# Patient Record
Sex: MALE | Race: WHITE | NOT HISPANIC OR LATINO | Employment: OTHER | URBAN - METROPOLITAN AREA
[De-identification: names, ages, dates, MRNs, and addresses within clinical notes are randomized per-mention and may not be internally consistent; named-entity substitution may affect disease eponyms.]

---

## 2023-02-28 LAB
ALANINE AMINOTRANSFERASE (SGPT) (U/L) IN SER/PLAS: 24 U/L (ref 10–52)
ALBUMIN (G/DL) IN SER/PLAS: 4.6 G/DL (ref 3.4–5)
ALKALINE PHOSPHATASE (U/L) IN SER/PLAS: 38 U/L (ref 33–136)
ANION GAP IN SER/PLAS: 14 MMOL/L (ref 10–20)
ASPARTATE AMINOTRANSFERASE (SGOT) (U/L) IN SER/PLAS: 19 U/L (ref 9–39)
BASOPHILS (10*3/UL) IN BLOOD BY AUTOMATED COUNT: 0.03 X10E9/L (ref 0–0.1)
BASOPHILS/100 LEUKOCYTES IN BLOOD BY AUTOMATED COUNT: 0.5 % (ref 0–2)
BILIRUBIN TOTAL (MG/DL) IN SER/PLAS: 1 MG/DL (ref 0–1.2)
CALCIUM (MG/DL) IN SER/PLAS: 10.2 MG/DL (ref 8.6–10.6)
CARBON DIOXIDE, TOTAL (MMOL/L) IN SER/PLAS: 29 MMOL/L (ref 21–32)
CHLORIDE (MMOL/L) IN SER/PLAS: 103 MMOL/L (ref 98–107)
CHOLESTEROL (MG/DL) IN SER/PLAS: 155 MG/DL (ref 0–199)
CHOLESTEROL IN HDL (MG/DL) IN SER/PLAS: 43.5 MG/DL
CHOLESTEROL/HDL RATIO: 3.6
CREATININE (MG/DL) IN SER/PLAS: 1.18 MG/DL (ref 0.5–1.3)
EOSINOPHILS (10*3/UL) IN BLOOD BY AUTOMATED COUNT: 0.06 X10E9/L (ref 0–0.7)
EOSINOPHILS/100 LEUKOCYTES IN BLOOD BY AUTOMATED COUNT: 1 % (ref 0–6)
ERYTHROCYTE DISTRIBUTION WIDTH (RATIO) BY AUTOMATED COUNT: 12.4 % (ref 11.5–14.5)
ERYTHROCYTE MEAN CORPUSCULAR HEMOGLOBIN CONCENTRATION (G/DL) BY AUTOMATED: 34.1 G/DL (ref 32–36)
ERYTHROCYTE MEAN CORPUSCULAR VOLUME (FL) BY AUTOMATED COUNT: 91 FL (ref 80–100)
ERYTHROCYTES (10*6/UL) IN BLOOD BY AUTOMATED COUNT: 4.7 X10E12/L (ref 4.5–5.9)
GFR MALE: 68 ML/MIN/1.73M2
GLUCOSE (MG/DL) IN SER/PLAS: 85 MG/DL (ref 74–99)
HEMATOCRIT (%) IN BLOOD BY AUTOMATED COUNT: 42.8 % (ref 41–52)
HEMOGLOBIN (G/DL) IN BLOOD: 14.6 G/DL (ref 13.5–17.5)
IMMATURE GRANULOCYTES/100 LEUKOCYTES IN BLOOD BY AUTOMATED COUNT: 0.3 % (ref 0–0.9)
LDL: 79 MG/DL (ref 0–99)
LEUKOCYTES (10*3/UL) IN BLOOD BY AUTOMATED COUNT: 5.8 X10E9/L (ref 4.4–11.3)
LYMPHOCYTES (10*3/UL) IN BLOOD BY AUTOMATED COUNT: 1.14 X10E9/L (ref 1.2–4.8)
LYMPHOCYTES/100 LEUKOCYTES IN BLOOD BY AUTOMATED COUNT: 19.7 % (ref 13–44)
MONOCYTES (10*3/UL) IN BLOOD BY AUTOMATED COUNT: 0.65 X10E9/L (ref 0.1–1)
MONOCYTES/100 LEUKOCYTES IN BLOOD BY AUTOMATED COUNT: 11.2 % (ref 2–10)
NEUTROPHILS (10*3/UL) IN BLOOD BY AUTOMATED COUNT: 3.88 X10E9/L (ref 1.2–7.7)
NEUTROPHILS/100 LEUKOCYTES IN BLOOD BY AUTOMATED COUNT: 67.3 % (ref 40–80)
NRBC (PER 100 WBCS) BY AUTOMATED COUNT: 0 /100 WBC (ref 0–0)
PLATELETS (10*3/UL) IN BLOOD AUTOMATED COUNT: 204 X10E9/L (ref 150–450)
POTASSIUM (MMOL/L) IN SER/PLAS: 4.6 MMOL/L (ref 3.5–5.3)
PROSTATE SPECIFIC ANTIGEN,SCREEN: 0.62 NG/ML (ref 0–4)
PROTEIN TOTAL: 7.1 G/DL (ref 6.4–8.2)
SODIUM (MMOL/L) IN SER/PLAS: 141 MMOL/L (ref 136–145)
THYROTROPIN (MIU/L) IN SER/PLAS BY DETECTION LIMIT <= 0.05 MIU/L: 1.29 MIU/L (ref 0.44–3.98)
TRIGLYCERIDE (MG/DL) IN SER/PLAS: 163 MG/DL (ref 0–149)
UREA NITROGEN (MG/DL) IN SER/PLAS: 20 MG/DL (ref 6–23)
VLDL: 33 MG/DL (ref 0–40)

## 2023-03-01 LAB
ABO GROUP (TYPE) IN BLOOD: NORMAL
RH FACTOR: NORMAL

## 2023-04-13 ENCOUNTER — TELEPHONE (OUTPATIENT)
Dept: PRIMARY CARE | Facility: CLINIC | Age: 68
End: 2023-04-13
Payer: MEDICARE

## 2023-04-13 NOTE — TELEPHONE ENCOUNTER
Patient called he sees Dr. Hill believes he has pink eye. Left lower eye lid is puffy and sore, lid looks red, itchy on and off, some pain, has had since Tuesday morning , no light sensitive, no discharge.  He was with someone on Saturday that had pink eye. That's why he thinks he has it.       Please advise

## 2023-04-13 NOTE — TELEPHONE ENCOUNTER
This is not likely bacterial conjunctivitis but instead allergy based  Would do warm compresses to eye as many times as able throughout the day  Can get otc allergy eye drops to use as well  Monitor for now  This is not pink eye as you have light sensitivity, thick and copious amounts of drainage that causes severe matting of eyelashes to the point of eye shut when waking up

## 2023-07-21 PROBLEM — J20.9 ACUTE BRONCHITIS: Status: ACTIVE | Noted: 2023-07-21

## 2023-07-21 PROBLEM — J30.9 ALLERGIC RHINITIS: Status: ACTIVE | Noted: 2023-07-21

## 2023-07-21 PROBLEM — R79.82 CRP ELEVATED: Status: ACTIVE | Noted: 2023-07-21

## 2023-07-21 PROBLEM — R06.83 SNORING: Status: ACTIVE | Noted: 2023-07-21

## 2023-07-21 PROBLEM — K63.5 COLON POLYP: Status: ACTIVE | Noted: 2023-07-21

## 2023-07-21 PROBLEM — I45.9 INTRAVENTRICULAR CONDUCTION DELAY: Status: ACTIVE | Noted: 2023-07-21

## 2023-07-21 PROBLEM — M99.03 LUMBOSACRAL DYSFUNCTION: Status: ACTIVE | Noted: 2023-07-21

## 2023-07-21 PROBLEM — U07.1 COVID-19 VIRUS INFECTION: Status: ACTIVE | Noted: 2023-07-21

## 2023-07-21 PROBLEM — K29.50 CHRONIC GASTRITIS: Status: ACTIVE | Noted: 2023-07-21

## 2023-07-21 PROBLEM — M79.18 CERVICAL MYOFASCIAL PAIN SYNDROME: Status: ACTIVE | Noted: 2023-07-21

## 2023-07-21 PROBLEM — M79.10 MYALGIA: Status: ACTIVE | Noted: 2023-07-21

## 2023-07-21 PROBLEM — R73.9 HYPERGLYCEMIA: Status: ACTIVE | Noted: 2023-07-21

## 2023-07-21 PROBLEM — K26.9 DUODENAL ULCER: Status: ACTIVE | Noted: 2023-07-21

## 2023-07-21 PROBLEM — R59.9 ENLARGED LYMPH NODE: Status: ACTIVE | Noted: 2023-07-21

## 2023-07-21 PROBLEM — M99.09 SEGMENTAL AND SOMATIC DYSFUNCTION: Status: ACTIVE | Noted: 2023-07-21

## 2023-07-21 PROBLEM — I10 BENIGN ESSENTIAL HYPERTENSION: Status: ACTIVE | Noted: 2023-07-21

## 2023-07-21 PROBLEM — M54.6 THORACIC SPINE PAIN: Status: ACTIVE | Noted: 2023-07-21

## 2023-07-21 PROBLEM — R31.9 HEMATURIA: Status: ACTIVE | Noted: 2023-07-21

## 2023-07-21 PROBLEM — L65.9 ALOPECIA: Status: ACTIVE | Noted: 2023-07-21

## 2023-07-21 PROBLEM — E78.00 HYPERCHOLESTEROLEMIA: Status: ACTIVE | Noted: 2023-07-21

## 2023-07-21 PROBLEM — J31.0 CHRONIC RHINITIS: Status: ACTIVE | Noted: 2023-07-21

## 2023-07-21 PROBLEM — M19.019 OSTEOARTHRITIS, SHOULDER: Status: ACTIVE | Noted: 2023-07-21

## 2023-07-21 PROBLEM — H93.19 TINNITUS: Status: ACTIVE | Noted: 2023-07-21

## 2023-07-21 PROBLEM — R94.31 ST SEGMENT ABNORMALITY: Status: ACTIVE | Noted: 2023-07-21

## 2023-07-21 PROBLEM — R40.0 SOMNOLENCE, DAYTIME: Status: ACTIVE | Noted: 2023-07-21

## 2023-07-21 PROBLEM — I47.10 PAROXYSMAL SVT (SUPRAVENTRICULAR TACHYCARDIA) (CMS-HCC): Status: ACTIVE | Noted: 2023-07-21

## 2023-07-21 PROBLEM — S46.011A STRAIN OF RIGHT ROTATOR CUFF CAPSULE: Status: ACTIVE | Noted: 2023-07-21

## 2023-07-21 PROBLEM — E55.9 VITAMIN D DEFICIENCY: Status: ACTIVE | Noted: 2023-07-21

## 2023-07-21 PROBLEM — R00.1 BRADYCARDIA BY ELECTROCARDIOGRAM: Status: ACTIVE | Noted: 2023-07-21

## 2023-07-21 PROBLEM — I25.10 CAD (CORONARY ARTERY DISEASE): Status: ACTIVE | Noted: 2023-07-21

## 2023-07-21 PROBLEM — M25.552 HIP PAIN, LEFT: Status: ACTIVE | Noted: 2023-07-21

## 2023-07-21 PROBLEM — M25.811 SHOULDER IMPINGEMENT, RIGHT: Status: ACTIVE | Noted: 2023-07-21

## 2023-07-21 PROBLEM — R53.81 MALAISE AND FATIGUE: Status: ACTIVE | Noted: 2023-07-21

## 2023-07-21 PROBLEM — R06.09 DYSPNEA ON EXERTION: Status: ACTIVE | Noted: 2023-07-21

## 2023-07-21 PROBLEM — R05.9 COUGH: Status: ACTIVE | Noted: 2023-07-21

## 2023-07-21 PROBLEM — R19.5 POSITIVE OCCULT STOOL BLOOD TEST: Status: ACTIVE | Noted: 2023-07-21

## 2023-07-21 PROBLEM — E78.5 HYPERLIPIDEMIA: Status: ACTIVE | Noted: 2023-07-21

## 2023-07-21 PROBLEM — K64.8 INTERNAL HEMORRHOIDS: Status: ACTIVE | Noted: 2023-07-21

## 2023-07-21 PROBLEM — G43.009 MIGRAINE WITHOUT AURA AND WITHOUT STATUS MIGRAINOSUS, NOT INTRACTABLE: Status: ACTIVE | Noted: 2023-07-21

## 2023-07-21 PROBLEM — R91.1 PULMONARY NODULE: Status: ACTIVE | Noted: 2023-07-21

## 2023-07-21 PROBLEM — I27.20 PULMONARY HYPERTENSION, MILD (MULTI): Status: ACTIVE | Noted: 2023-07-21

## 2023-07-21 PROBLEM — R20.0 ANESTHESIA OF SKIN: Status: ACTIVE | Noted: 2023-07-21

## 2023-07-21 PROBLEM — D72.819 WBC DECREASED: Status: ACTIVE | Noted: 2023-07-21

## 2023-07-21 PROBLEM — M79.674 PAIN OF TOE OF RIGHT FOOT: Status: ACTIVE | Noted: 2023-07-21

## 2023-07-21 PROBLEM — K44.9 HIATAL HERNIA: Status: ACTIVE | Noted: 2023-07-21

## 2023-07-21 PROBLEM — R53.83 MALAISE AND FATIGUE: Status: ACTIVE | Noted: 2023-07-21

## 2023-07-21 PROBLEM — M54.2 NECK PAIN: Status: ACTIVE | Noted: 2023-07-21

## 2023-07-21 PROBLEM — K27.9 PUD (PEPTIC ULCER DISEASE): Status: ACTIVE | Noted: 2023-07-21

## 2023-07-21 PROBLEM — S29.019A THORACIC MYOFASCIAL STRAIN: Status: ACTIVE | Noted: 2023-07-21

## 2023-07-21 PROBLEM — K64.4 EXTERNAL HEMORRHOIDS: Status: ACTIVE | Noted: 2023-07-21

## 2023-07-21 PROBLEM — M25.519 SHOULDER PAIN: Status: ACTIVE | Noted: 2023-07-21

## 2023-07-21 PROBLEM — H61.21 IMPACTED CERUMEN OF RIGHT EAR: Status: ACTIVE | Noted: 2023-07-21

## 2023-07-21 PROBLEM — R05.3 COUGH, PERSISTENT: Status: ACTIVE | Noted: 2023-07-21

## 2023-07-21 PROBLEM — G47.33 OBSTRUCTIVE SLEEP APNEA OF ADULT: Status: ACTIVE | Noted: 2023-07-21

## 2023-07-21 RX ORDER — LISINOPRIL 20 MG/1
1 TABLET ORAL DAILY
COMMUNITY
Start: 2022-02-24 | End: 2023-08-15

## 2023-07-21 RX ORDER — MULTIVITAMIN
1 TABLET ORAL DAILY
COMMUNITY

## 2023-07-21 RX ORDER — MINOXIDIL 50 MG/G
AEROSOL, FOAM TOPICAL
COMMUNITY

## 2023-07-21 RX ORDER — TALC
POWDER (GRAM) TOPICAL
COMMUNITY
End: 2023-08-18 | Stop reason: WASHOUT

## 2023-07-21 RX ORDER — ACETAMINOPHEN 500 MG
2 TABLET ORAL DAILY
COMMUNITY

## 2023-07-21 RX ORDER — KETOCONAZOLE 20 MG/ML
SHAMPOO, SUSPENSION TOPICAL
COMMUNITY

## 2023-07-21 RX ORDER — ASPIRIN 81 MG/1
TABLET ORAL
COMMUNITY

## 2023-07-21 RX ORDER — ATORVASTATIN CALCIUM 40 MG/1
1 TABLET, FILM COATED ORAL NIGHTLY
COMMUNITY
Start: 2015-02-24 | End: 2023-09-05

## 2023-07-21 RX ORDER — ACETAMINOPHEN 160 MG/5ML
1 SUSPENSION, ORAL (FINAL DOSE FORM) ORAL DAILY
COMMUNITY

## 2023-07-21 RX ORDER — BENZONATATE 200 MG/1
1 CAPSULE ORAL EVERY 8 HOURS PRN
COMMUNITY
Start: 2022-08-22 | End: 2023-08-18 | Stop reason: WASHOUT

## 2023-08-14 DIAGNOSIS — I10 BENIGN ESSENTIAL HYPERTENSION: Primary | ICD-10-CM

## 2023-08-15 RX ORDER — LISINOPRIL 20 MG/1
20 TABLET ORAL DAILY
Qty: 90 TABLET | Refills: 1 | Status: SHIPPED | OUTPATIENT
Start: 2023-08-15 | End: 2023-11-20

## 2023-08-18 ENCOUNTER — OFFICE VISIT (OUTPATIENT)
Dept: PRIMARY CARE | Facility: CLINIC | Age: 68
End: 2023-08-18
Payer: MEDICARE

## 2023-08-18 VITALS
SYSTOLIC BLOOD PRESSURE: 128 MMHG | BODY MASS INDEX: 30.16 KG/M2 | WEIGHT: 233.3 LBS | DIASTOLIC BLOOD PRESSURE: 74 MMHG | HEART RATE: 60 BPM

## 2023-08-18 DIAGNOSIS — I10 BENIGN ESSENTIAL HYPERTENSION: Primary | ICD-10-CM

## 2023-08-18 DIAGNOSIS — G47.33 OBSTRUCTIVE SLEEP APNEA OF ADULT: ICD-10-CM

## 2023-08-18 DIAGNOSIS — E55.9 VITAMIN D DEFICIENCY: ICD-10-CM

## 2023-08-18 DIAGNOSIS — I25.10 CORONARY ARTERY DISEASE DUE TO CALCIFIED CORONARY LESION: ICD-10-CM

## 2023-08-18 DIAGNOSIS — I25.84 CORONARY ARTERY DISEASE DUE TO CALCIFIED CORONARY LESION: ICD-10-CM

## 2023-08-18 DIAGNOSIS — E78.00 HYPERCHOLESTEROLEMIA: ICD-10-CM

## 2023-08-18 LAB
ALANINE AMINOTRANSFERASE (SGPT) (U/L) IN SER/PLAS: 21 U/L (ref 10–52)
ALBUMIN (G/DL) IN SER/PLAS: 4.7 G/DL (ref 3.4–5)
ALKALINE PHOSPHATASE (U/L) IN SER/PLAS: 45 U/L (ref 33–136)
ANION GAP IN SER/PLAS: 14 MMOL/L (ref 10–20)
ASPARTATE AMINOTRANSFERASE (SGOT) (U/L) IN SER/PLAS: 18 U/L (ref 9–39)
BILIRUBIN TOTAL (MG/DL) IN SER/PLAS: 0.7 MG/DL (ref 0–1.2)
CALCIDIOL (25 OH VITAMIN D3) (NG/ML) IN SER/PLAS: 66 NG/ML
CALCIUM (MG/DL) IN SER/PLAS: 9.9 MG/DL (ref 8.6–10.6)
CARBON DIOXIDE, TOTAL (MMOL/L) IN SER/PLAS: 28 MMOL/L (ref 21–32)
CHLORIDE (MMOL/L) IN SER/PLAS: 103 MMOL/L (ref 98–107)
CHOLESTEROL (MG/DL) IN SER/PLAS: 169 MG/DL (ref 0–199)
CHOLESTEROL IN HDL (MG/DL) IN SER/PLAS: 48.5 MG/DL
CHOLESTEROL/HDL RATIO: 3.5
CREATININE (MG/DL) IN SER/PLAS: 1.15 MG/DL (ref 0.5–1.3)
GFR MALE: 70 ML/MIN/1.73M2
GLUCOSE (MG/DL) IN SER/PLAS: 99 MG/DL (ref 74–99)
LDL: 97 MG/DL (ref 0–99)
POTASSIUM (MMOL/L) IN SER/PLAS: 4.6 MMOL/L (ref 3.5–5.3)
PROTEIN TOTAL: 7.1 G/DL (ref 6.4–8.2)
SODIUM (MMOL/L) IN SER/PLAS: 140 MMOL/L (ref 136–145)
TRIGLYCERIDE (MG/DL) IN SER/PLAS: 120 MG/DL (ref 0–149)
UREA NITROGEN (MG/DL) IN SER/PLAS: 18 MG/DL (ref 6–23)
VLDL: 24 MG/DL (ref 0–40)

## 2023-08-18 PROCEDURE — 82306 VITAMIN D 25 HYDROXY: CPT

## 2023-08-18 PROCEDURE — 3074F SYST BP LT 130 MM HG: CPT | Performed by: INTERNAL MEDICINE

## 2023-08-18 PROCEDURE — 1159F MED LIST DOCD IN RCRD: CPT | Performed by: INTERNAL MEDICINE

## 2023-08-18 PROCEDURE — 1160F RVW MEDS BY RX/DR IN RCRD: CPT | Performed by: INTERNAL MEDICINE

## 2023-08-18 PROCEDURE — 80053 COMPREHEN METABOLIC PANEL: CPT

## 2023-08-18 PROCEDURE — 99214 OFFICE O/P EST MOD 30 MIN: CPT | Performed by: INTERNAL MEDICINE

## 2023-08-18 PROCEDURE — 3078F DIAST BP <80 MM HG: CPT | Performed by: INTERNAL MEDICINE

## 2023-08-18 PROCEDURE — 1036F TOBACCO NON-USER: CPT | Performed by: INTERNAL MEDICINE

## 2023-08-18 PROCEDURE — 80061 LIPID PANEL: CPT

## 2023-08-18 ASSESSMENT — ENCOUNTER SYMPTOMS
ARTHRALGIAS: 0
VOICE CHANGE: 0
DIFFICULTY URINATING: 0
EYE REDNESS: 0
DIZZINESS: 0
FLANK PAIN: 0
NAUSEA: 0
APPETITE CHANGE: 0
HEADACHES: 0
NUMBNESS: 0
SINUS PAIN: 0
FATIGUE: 0
DYSURIA: 0
UNEXPECTED WEIGHT CHANGE: 0
EYE PAIN: 0
BLOOD IN STOOL: 0
TROUBLE SWALLOWING: 0
COUGH: 0
MYALGIAS: 0
ABDOMINAL PAIN: 0
COLOR CHANGE: 0
SLEEP DISTURBANCE: 0
ABDOMINAL DISTENTION: 0
WHEEZING: 0
EYE DISCHARGE: 0
DIARRHEA: 0
FEVER: 0
CONSTIPATION: 0
WOUND: 0
PALPITATIONS: 0
NERVOUS/ANXIOUS: 0
DYSPHORIC MOOD: 0
SINUS PRESSURE: 0
SHORTNESS OF BREATH: 0
NECK PAIN: 0
ACTIVITY CHANGE: 0
WEAKNESS: 0
CHEST TIGHTNESS: 0
SORE THROAT: 0
BRUISES/BLEEDS EASILY: 0
SEIZURES: 0
TREMORS: 0
DIAPHORESIS: 0
BACK PAIN: 0
ADENOPATHY: 0
HEMATURIA: 0
FACIAL SWELLING: 0
VOMITING: 0
FREQUENCY: 0
EYE ITCHING: 0
JOINT SWELLING: 0
CHOKING: 0
CHILLS: 0
CONFUSION: 0
NECK STIFFNESS: 0
LIGHT-HEADEDNESS: 0

## 2023-08-18 NOTE — PROGRESS NOTES
Subjective   Patient ID: Mike Valdez is a 68 y.o. male who presents for Follow-up (6m).    He has a history of CAD, hypercholesterolemia, SVTs, BRENTON vitamin d deficiency    BP at home 109/62 - 130/69, mostly 110-120s/ 60s  He is cycling quite a bit. More active in summer. Does have sore tailbone since then.             Review of Systems   Constitutional:  Negative for activity change, appetite change, chills, diaphoresis, fatigue, fever and unexpected weight change.   HENT:  Positive for postnasal drip. Negative for congestion, ear discharge, ear pain, facial swelling, hearing loss, sinus pressure, sinus pain, sore throat, trouble swallowing and voice change.    Eyes:  Negative for pain, discharge, redness, itching and visual disturbance.   Respiratory:  Negative for cough, choking, chest tightness, shortness of breath and wheezing.    Cardiovascular:  Negative for chest pain, palpitations and leg swelling.   Gastrointestinal:  Negative for abdominal distention, abdominal pain, blood in stool, constipation, diarrhea, nausea and vomiting.   Endocrine: Negative for cold intolerance and heat intolerance.   Genitourinary:  Negative for difficulty urinating, dysuria, flank pain, frequency, hematuria and urgency.   Musculoskeletal:  Negative for arthralgias, back pain, gait problem, joint swelling, myalgias, neck pain and neck stiffness.   Skin:  Negative for color change, pallor, rash and wound.   Neurological:  Negative for dizziness, tremors, seizures, syncope, weakness, light-headedness, numbness and headaches.   Hematological:  Negative for adenopathy. Does not bruise/bleed easily.   Psychiatric/Behavioral:  Negative for behavioral problems, confusion, dysphoric mood, sleep disturbance and suicidal ideas. The patient is not nervous/anxious.        Objective   /74   Pulse 60   Wt 106 kg (233 lb 4.8 oz)   BMI 30.16 kg/m²     Physical Exam  Constitutional:       Appearance: Normal appearance. He is obese.    HENT:      Head: Normocephalic and atraumatic.   Eyes:      General: No scleral icterus.     Pupils: Pupils are equal, round, and reactive to light.   Neck:      Vascular: No carotid bruit.   Cardiovascular:      Rate and Rhythm: Normal rate and regular rhythm.      Pulses: Normal pulses.      Heart sounds: Normal heart sounds.      Comments: Mild venous stasis discoloration bilateral lower legs  Pulmonary:      Effort: Pulmonary effort is normal.      Breath sounds: Normal breath sounds. No wheezing, rhonchi or rales.   Abdominal:      General: Bowel sounds are normal. There is no distension.      Palpations: Abdomen is soft. There is no mass.      Tenderness: There is no abdominal tenderness.   Musculoskeletal:      Cervical back: Normal range of motion.      Right lower leg: Edema (trace) present.      Left lower leg: Edema (trace) present.   Skin:     General: Skin is warm and dry.   Neurological:      General: No focal deficit present.      Mental Status: He is alert and oriented to person, place, and time. Mental status is at baseline.   Psychiatric:         Mood and Affect: Mood normal.         Behavior: Behavior normal.         Thought Content: Thought content normal.         Judgment: Judgment normal.         Assessment/Plan   Problem List Items Addressed This Visit       Benign essential hypertension - Primary     Blood pressure excellent  Continue lisinopril 20 mg daily  Regular exercise and DASH diet         Relevant Orders    Comprehensive Metabolic Panel    CAD (coronary artery disease)     Clinically stable  continue aspirin 81 milligrams, atorvastatin 40 milligrams         Relevant Orders    Comprehensive Metabolic Panel    Lipid Panel    Hypercholesterolemia     Continue atorvastatin 40 mg daily  Blood work done         Relevant Orders    Comprehensive Metabolic Panel    Lipid Panel    Obstructive sleep apnea of adult      doing well with AutoPap per sleep specialist Dr. Mcgovern         Vitamin D  deficiency     Currently taking vitamin D 2000 international units daily  We will check vitamin D 25-hydroxy and further advise         Relevant Orders    Vitamin D, Total

## 2023-08-18 NOTE — ASSESSMENT & PLAN NOTE
Currently taking vitamin D 2000 international units daily  We will check vitamin D 25-hydroxy and further advise   Hpi Title: Evaluation of Skin Lesions How Severe Are Your Spot(S)?: mild Have Your Spot(S) Been Treated In The Past?: has not been treated

## 2023-08-31 DIAGNOSIS — E78.5 HYPERLIPIDEMIA, UNSPECIFIED HYPERLIPIDEMIA TYPE: ICD-10-CM

## 2023-09-05 RX ORDER — ATORVASTATIN CALCIUM 40 MG/1
40 TABLET, FILM COATED ORAL NIGHTLY
Qty: 90 TABLET | Refills: 1 | Status: SHIPPED | OUTPATIENT
Start: 2023-09-05 | End: 2023-11-10

## 2023-11-09 DIAGNOSIS — E78.5 HYPERLIPIDEMIA, UNSPECIFIED HYPERLIPIDEMIA TYPE: ICD-10-CM

## 2023-11-10 RX ORDER — ATORVASTATIN CALCIUM 40 MG/1
40 TABLET, FILM COATED ORAL NIGHTLY
Qty: 100 TABLET | Refills: 0 | Status: SHIPPED | OUTPATIENT
Start: 2023-11-10 | End: 2024-01-19

## 2023-11-17 DIAGNOSIS — I10 BENIGN ESSENTIAL HYPERTENSION: ICD-10-CM

## 2023-11-20 RX ORDER — LISINOPRIL 20 MG/1
20 TABLET ORAL DAILY
Qty: 90 TABLET | Refills: 0 | Status: SHIPPED | OUTPATIENT
Start: 2023-11-20 | End: 2024-03-14 | Stop reason: SDUPTHER

## 2024-01-18 DIAGNOSIS — E78.5 HYPERLIPIDEMIA, UNSPECIFIED HYPERLIPIDEMIA TYPE: ICD-10-CM

## 2024-01-19 RX ORDER — ATORVASTATIN CALCIUM 40 MG/1
40 TABLET, FILM COATED ORAL NIGHTLY
Qty: 100 TABLET | Refills: 2 | Status: SHIPPED | OUTPATIENT
Start: 2024-01-19 | End: 2024-03-14 | Stop reason: SDUPTHER

## 2024-01-19 NOTE — TELEPHONE ENCOUNTER
See refill request, he has an office visit with you 03/14/2024, he was a patient of Dr. Francis.    Thank you.

## 2024-02-05 ENCOUNTER — PATIENT MESSAGE (OUTPATIENT)
Dept: PRIMARY CARE | Facility: CLINIC | Age: 69
End: 2024-02-05
Payer: MEDICARE

## 2024-02-12 ENCOUNTER — OFFICE VISIT (OUTPATIENT)
Dept: SLEEP MEDICINE | Facility: CLINIC | Age: 69
End: 2024-02-12
Payer: MEDICARE

## 2024-02-12 VITALS
DIASTOLIC BLOOD PRESSURE: 86 MMHG | WEIGHT: 250.6 LBS | TEMPERATURE: 98.4 F | BODY MASS INDEX: 32.39 KG/M2 | HEART RATE: 72 BPM | SYSTOLIC BLOOD PRESSURE: 172 MMHG

## 2024-02-12 DIAGNOSIS — G47.33 OSA (OBSTRUCTIVE SLEEP APNEA): ICD-10-CM

## 2024-02-12 DIAGNOSIS — I10 BENIGN ESSENTIAL HYPERTENSION: ICD-10-CM

## 2024-02-12 DIAGNOSIS — G47.33 OBSTRUCTIVE SLEEP APNEA OF ADULT: Primary | ICD-10-CM

## 2024-02-12 PROCEDURE — 1160F RVW MEDS BY RX/DR IN RCRD: CPT | Performed by: STUDENT IN AN ORGANIZED HEALTH CARE EDUCATION/TRAINING PROGRAM

## 2024-02-12 PROCEDURE — 3079F DIAST BP 80-89 MM HG: CPT | Performed by: STUDENT IN AN ORGANIZED HEALTH CARE EDUCATION/TRAINING PROGRAM

## 2024-02-12 PROCEDURE — 1036F TOBACCO NON-USER: CPT | Performed by: STUDENT IN AN ORGANIZED HEALTH CARE EDUCATION/TRAINING PROGRAM

## 2024-02-12 PROCEDURE — 1159F MED LIST DOCD IN RCRD: CPT | Performed by: STUDENT IN AN ORGANIZED HEALTH CARE EDUCATION/TRAINING PROGRAM

## 2024-02-12 PROCEDURE — 1157F ADVNC CARE PLAN IN RCRD: CPT | Performed by: STUDENT IN AN ORGANIZED HEALTH CARE EDUCATION/TRAINING PROGRAM

## 2024-02-12 PROCEDURE — 3077F SYST BP >= 140 MM HG: CPT | Performed by: STUDENT IN AN ORGANIZED HEALTH CARE EDUCATION/TRAINING PROGRAM

## 2024-02-12 PROCEDURE — 99204 OFFICE O/P NEW MOD 45 MIN: CPT | Performed by: STUDENT IN AN ORGANIZED HEALTH CARE EDUCATION/TRAINING PROGRAM

## 2024-02-12 ASSESSMENT — SLEEP AND FATIGUE QUESTIONNAIRES
SLEEP_PROBLEM_INTERFERES_DAILY_ACTIVITIES: A LITTLE
HOW LIKELY ARE YOU TO NOD OFF OR FALL ASLEEP WHEN YOU ARE A PASSENGER IN A CAR FOR AN HOUR WITHOUT A BREAK: WOULD NEVER DOZE
HOW LIKELY ARE YOU TO NOD OFF OR FALL ASLEEP WHILE SITTING AND TALKING TO SOMEONE: WOULD NEVER DOZE
HOW LIKELY ARE YOU TO NOD OFF OR FALL ASLEEP WHILE SITTING AND READING: SLIGHT CHANCE OF DOZING
HOW LIKELY ARE YOU TO NOD OFF OR FALL ASLEEP WHILE WATCHING TV: SLIGHT CHANCE OF DOZING
DIFFICULTY_STAYING_ASLEEP: MILD
HOW LIKELY ARE YOU TO NOD OFF OR FALL ASLEEP WHILE LYING DOWN TO REST IN THE AFTERNOON WHEN CIRCUMSTANCES PERMIT: WOULD NEVER DOZE
SLEEP_PROBLEM_NOTICEABLE_TO_OTHERS: A LITTLE
WORRIED_DISTRESSED_DUE_TO_SLEEP: A LITTLE
ESS-CHAD TOTAL SCORE: 4
HOW LIKELY ARE YOU TO NOD OFF OR FALL ASLEEP IN A CAR, WHILE STOPPED FOR A FEW MINUTES IN TRAFFIC: WOULD NEVER DOZE
SATISFACTION_WITH_CURRENT_SLEEP_PATTERN: VERY SATISFIED
SITING INACTIVE IN A PUBLIC PLACE LIKE A CLASS ROOM OR A MOVIE THEATER: SLIGHT CHANCE OF DOZING
HOW LIKELY ARE YOU TO NOD OFF OR FALL ASLEEP WHILE SITTING QUIETLY AFTER LUNCH WITHOUT ALCOHOL: SLIGHT CHANCE OF DOZING

## 2024-02-12 ASSESSMENT — ENCOUNTER SYMPTOMS
CONSTITUTIONAL NEGATIVE: 1
RESPIRATORY NEGATIVE: 1
PSYCHIATRIC NEGATIVE: 1
CARDIOVASCULAR NEGATIVE: 1
NEUROLOGICAL NEGATIVE: 1

## 2024-02-12 NOTE — LETTER
"2024     Asia Dasilva MD  [address]  [address]  [facility] [address]    Patient: Mike Valdez   YOB: 1955   Date of Visit: 2024       Dear Dr. Asia Dasilva MD:    Thank you for referring Mike Valdez to me for evaluation. Below are my notes for this consultation.  If you have questions, please do not hesitate to call me. I look forward to following your patient along with you.       Sincerely,     Senthil Bishop MD      CC: No Recipients  ______________________________________________________________________________________     Patient: Anthony Valdez    [id number]  : 1955 -- AGE 69 y.o.    Provider: Senthil Bishop MD     Location [facility]   Service Date: 2024              [facility]  New Visit Note      HISTORY OF PRESENT ILLNESS     The patient's referring provider is: Asia Dasilva MD    HISTORY OF PRESENT ILLNESS   Anthony Valdez \"Mike\" is a 69 y.o. male with h/o Hypertension, BRENTON, and Obesity who presents to a [facility] for a sleep medicine evaluation with concerns of Follow-up.     Past Sleep History  Patient has the following sleep-related diagnoses: obstructive sleep apnea. Prior sleep study results: significant for BRENTON .  He remembered his AHI back then was in the 40's.    Current History    On today's visit, the patient reports no issues with his CPAP therapy.  His previous provider unfortunately has passed away and he needs a new sleep provider.  His BP is elevated today but he reports that he is typically like that with some \"White Coat HTN\".  BP at home is pretty good.  Working on weight loss (less active during the winter but ready to get back).    Sleep schedule:      Weekdays / Work Days Weekends / Days Off   Bedtime 11 pm  same as weekdays   Sleep latency 15 min same as weekdays   Wake time 7:30 am  same as weekdays   Total sleep time  Average/day:  Total sleep time " 8 hours/day Same as weekdays   Naps No   Nocturnal awakenings Yes, about 0-1 times a night     Preferred sleeping position: SLEEP POSITION: sidelying    Sleep-related ROS:    Sleep Initiation: no problems going to sleep  Problems going to sleep associated with: No problems going to sleep    Sleep Maintenance: wakes up about 0-1 times per night and easily returns to sleep after awakening  Problems staying asleep associated with: Problems Staying Asleep: nocturia    Breathing during sleep: no symptoms of snoring or concerns of breathing at night with PAP therapy    RLS screen:  RLSSCREEN: - Sensations: Patient does not have unusual sensations in their extremities that cause an urge to move them     Daytime Symptoms  On awakening patient reports: no morning symptoms    Patient report some daytime symptoms including: DAYTIME SYMPTOMS: reports no daytime symptoms  Patient denies daytime symptoms including: Denies: excessive daytime sleepiness  Fatigue: denies feeling fatigue    ESS: 4  CELSO: 4  FOSQ: 40      REVIEW OF SYSTEMS     REVIEW OF SYSTEMS  Review of Systems   Constitutional: Negative.    HENT: Negative.     Respiratory: Negative.     Cardiovascular: Negative.    Genitourinary: Negative.    Skin: Negative.    Neurological: Negative.    Psychiatric/Behavioral: Negative.       ALLERGIES AND MEDICATIONS     ALLERGIES  No Known Allergies    MEDICATIONS  Current Outpatient Medications   Medication Sig Dispense Refill   • aspirin 81 mg EC tablet Take by mouth.     • atorvastatin (Lipitor) 40 mg tablet TAKE 1 TABLET BY MOUTH ONCE  DAILY AT BEDTIME 100 tablet 2   • cholecalciferol (Vitamin D-3) 50 mcg (2,000 unit) capsule Take 2 capsules (100 mcg) by mouth early in the morning..     • coenzyme Q-10 200 mg capsule Take 1 capsule (200 mg) by mouth once daily.     • ketoconazole (Nizoral) 2 % shampoo Use twice weekly     • lisinopril 20 mg tablet TAKE 1 TABLET BY MOUTH DAILY 90 tablet 0   • minoxidiL (Rogaine) 5 % foam USE  AS DIRECTED ON PACKAGE     • multivitamin tablet Take 1 tablet by mouth once daily.       No current facility-administered medications for this visit.         PAST HISTORY     PAST MEDICAL HISTORY  He  has a past medical history of Chronic rhinitis (12/04/2013), Personal history of other diseases of the digestive system, Personal history of other diseases of the digestive system, Personal history of other diseases of the respiratory system, and Unspecified fracture of lower end of unspecified humerus, initial encounter for closed fracture.    PAST SURGICAL HISTORY:  Past Surgical History:   Procedure Laterality Date   • OTHER SURGICAL HISTORY  02/24/2022    Rotator cuff repair   • OTHER SURGICAL HISTORY  03/02/2015    Acellular Dermal Allograft Eyelids   • VASECTOMY  04/10/2014    Surgery Vas Deferens Vasectomy       FAMILY HISTORY  No family history on file.    He does not have a family history of sleep disorder.    SOCIAL HISTORY  He  reports that he has never smoked. He has never used smokeless tobacco. He reports current alcohol use of about 7.0 standard drinks of alcohol per week. He reports that he does not use drugs.     Caffeine consumption: Yes, rarely (occasional)  Alcohol consumption: Yes, 1 drinks/day  Smoking: No  Marijuana: No      PHYSICAL EXAM     VITAL SIGNS: /86 (BP Location: Right arm, Patient Position: Sitting, BP Cuff Size: Adult)   Pulse 72   Temp 36.9 °C (98.4 °F) (Temporal)   Wt 114 kg (250 lb 9.6 oz)   BMI 32.39 kg/m²      CURRENT WEIGHT:   Vitals:    02/12/24 1020   Weight: 114 kg (250 lb 9.6 oz)     Body mass index is 32.39 kg/m².  PREVIOUS WEIGHTS:  Wt Readings from Last 3 Encounters:   02/12/24 114 kg (250 lb 9.6 oz)   08/18/23 106 kg (233 lb 4.8 oz)   02/28/23 106 kg (234 lb 8 oz)       Physical Exam  Vitals reviewed.   Constitutional:       General: He is not in acute distress.     Appearance: Normal appearance. He is well-developed and normal weight.   HENT:      Head:  Normocephalic and atraumatic.      Nose: Nose normal. No congestion or rhinorrhea.      Mouth/Throat:      Mouth: Mucous membranes are moist.      Pharynx: Oropharynx is clear. No oropharyngeal exudate.   Eyes:      General: No scleral icterus.     Extraocular Movements: Extraocular movements intact.      Conjunctiva/sclera: Conjunctivae normal.      Pupils: Pupils are equal, round, and reactive to light.   Neck:      Thyroid: No thyroid mass or thyroid tenderness.      Vascular: No JVD.   Cardiovascular:      Rate and Rhythm: Normal rate.      Pulses: Normal pulses.   Pulmonary:      Effort: Pulmonary effort is normal. No respiratory distress.   Musculoskeletal:      Cervical back: Normal range of motion and neck supple. No rigidity or tenderness.   Lymphadenopathy:      Cervical: No cervical adenopathy.   Neurological:      Mental Status: He is alert and oriented to person, place, and time.   Psychiatric:         Mood and Affect: Mood normal.         Behavior: Behavior normal.         Thought Content: Thought content normal.       PHYSICAL EXAM: MODIFIED MALLAMPATI SCORE: III (soft and hard palate and base of uvula visible)  TONGUE SCALLOPING: without scalloping      RESULTS/DATA     Bicarbonate (mmol/L)   Date Value   08/18/2023 28   02/28/2023 29   09/19/2022 24     Iron (ug/dL)   Date Value   12/28/2018 104     Iron Saturation (%)   Date Value   12/28/2018 35     TIBC (ug/dL)   Date Value   12/28/2018 300     Ferritin (ug/L)   Date Value   12/28/2018 266             ASSESSMENT/PLAN     Mr. Valdez is a 69 y.o. male and He was referred to the [facility] for evaluation of BRENTON    Problem List, Orders, Assessment, Recommendations:  Problem List Items Addressed This Visit             ICD-10-CM    Benign essential hypertension I10     BP Readings from Last 1 Encounters:   02/12/24 172/86   - doing well, elevated today (typically so in doc's office) but asymptomatic  - discussed at  length the impact of untreated BRENTON and BP control  - continue current management and follow-up with PCP          Obstructive sleep apnea of adult - Primary G47.33     He is looking for new sleep provider.  BRENTON diagnosed 6 years ago (AHI ~ 40)  Now on 3B Nubia with setting of 4-20 cwp  rAHI was 2, 95% pressure was 8.5 cwp  No issues  Renew supply via [employer]          Other Visit Diagnoses         Codes    BRENTON (obstructive sleep apnea)     G47.33    Relevant Orders    Positive Airway Pressure (PAP) Therapy            Disposition    Return to clinic in 12 months

## 2024-02-12 NOTE — PROGRESS NOTES
" Patient: Anthony Valdez    [id number]  : 1955 -- AGE 69 y.o.    Provider: Senthil Bishop MD     Location [facility]   Service Date: 2024              [facility]  New Visit Note      HISTORY OF PRESENT ILLNESS     The patient's referring provider is: Asia Dasilva MD    HISTORY OF PRESENT ILLNESS   Anthony Valdez \"Mike\" is a 69 y.o. male with h/o Hypertension, BRENTON, and Obesity who presents to a [facility] for a sleep medicine evaluation with concerns of Follow-up.     Past Sleep History  Patient has the following sleep-related diagnoses: obstructive sleep apnea. Prior sleep study results: significant for BRENTON .  He remembered his AHI back then was in the 40's.    Current History    On today's visit, the patient reports no issues with his CPAP therapy.  His previous provider unfortunately has passed away and he needs a new sleep provider.  His BP is elevated today but he reports that he is typically like that with some \"White Coat HTN\".  BP at home is pretty good.  Working on weight loss (less active during the winter but ready to get back).    Sleep schedule:      Weekdays / Work Days Weekends / Days Off   Bedtime 11 pm  same as weekdays   Sleep latency 15 min same as weekdays   Wake time 7:30 am  same as weekdays   Total sleep time  Average/day:  Total sleep time 8 hours/day Same as weekdays   Naps No   Nocturnal awakenings Yes, about 0-1 times a night     Preferred sleeping position: SLEEP POSITION: sidelying    Sleep-related ROS:    Sleep Initiation: no problems going to sleep  Problems going to sleep associated with: No problems going to sleep    Sleep Maintenance: wakes up about 0-1 times per night and easily returns to sleep after awakening  Problems staying asleep associated with: Problems Staying Asleep: nocturia    Breathing during sleep: no symptoms of snoring or concerns of breathing at night with PAP therapy    RLS " screen:  RLSSCREEN: - Sensations: Patient does not have unusual sensations in their extremities that cause an urge to move them     Daytime Symptoms  On awakening patient reports: no morning symptoms    Patient report some daytime symptoms including: DAYTIME SYMPTOMS: reports no daytime symptoms  Patient denies daytime symptoms including: Denies: excessive daytime sleepiness  Fatigue: denies feeling fatigue    ESS: 4  CELSO: 4  FOSQ: 40      REVIEW OF SYSTEMS     REVIEW OF SYSTEMS  Review of Systems   Constitutional: Negative.    HENT: Negative.     Respiratory: Negative.     Cardiovascular: Negative.    Genitourinary: Negative.    Skin: Negative.    Neurological: Negative.    Psychiatric/Behavioral: Negative.       ALLERGIES AND MEDICATIONS     ALLERGIES  No Known Allergies    MEDICATIONS  Current Outpatient Medications   Medication Sig Dispense Refill    aspirin 81 mg EC tablet Take by mouth.      atorvastatin (Lipitor) 40 mg tablet TAKE 1 TABLET BY MOUTH ONCE  DAILY AT BEDTIME 100 tablet 2    cholecalciferol (Vitamin D-3) 50 mcg (2,000 unit) capsule Take 2 capsules (100 mcg) by mouth early in the morning..      coenzyme Q-10 200 mg capsule Take 1 capsule (200 mg) by mouth once daily.      ketoconazole (Nizoral) 2 % shampoo Use twice weekly      lisinopril 20 mg tablet TAKE 1 TABLET BY MOUTH DAILY 90 tablet 0    minoxidiL (Rogaine) 5 % foam USE AS DIRECTED ON PACKAGE      multivitamin tablet Take 1 tablet by mouth once daily.       No current facility-administered medications for this visit.         PAST HISTORY     PAST MEDICAL HISTORY  He  has a past medical history of Chronic rhinitis (12/04/2013), Personal history of other diseases of the digestive system, Personal history of other diseases of the digestive system, Personal history of other diseases of the respiratory system, and Unspecified fracture of lower end of unspecified humerus, initial encounter for closed fracture.    PAST SURGICAL HISTORY:  Past  Surgical History:   Procedure Laterality Date    OTHER SURGICAL HISTORY  02/24/2022    Rotator cuff repair    OTHER SURGICAL HISTORY  03/02/2015    Acellular Dermal Allograft Eyelids    VASECTOMY  04/10/2014    Surgery Vas Deferens Vasectomy       FAMILY HISTORY  No family history on file.    He does not have a family history of sleep disorder.    SOCIAL HISTORY  He  reports that he has never smoked. He has never used smokeless tobacco. He reports current alcohol use of about 7.0 standard drinks of alcohol per week. He reports that he does not use drugs.     Caffeine consumption: Yes, rarely (occasional)  Alcohol consumption: Yes, 1 drinks/day  Smoking: No  Marijuana: No      PHYSICAL EXAM     VITAL SIGNS: /86 (BP Location: Right arm, Patient Position: Sitting, BP Cuff Size: Adult)   Pulse 72   Temp 36.9 °C (98.4 °F) (Temporal)   Wt 114 kg (250 lb 9.6 oz)   BMI 32.39 kg/m²      CURRENT WEIGHT:   Vitals:    02/12/24 1020   Weight: 114 kg (250 lb 9.6 oz)     Body mass index is 32.39 kg/m².  PREVIOUS WEIGHTS:  Wt Readings from Last 3 Encounters:   02/12/24 114 kg (250 lb 9.6 oz)   08/18/23 106 kg (233 lb 4.8 oz)   02/28/23 106 kg (234 lb 8 oz)       Physical Exam  Vitals reviewed.   Constitutional:       General: He is not in acute distress.     Appearance: Normal appearance. He is well-developed and normal weight.   HENT:      Head: Normocephalic and atraumatic.      Nose: Nose normal. No congestion or rhinorrhea.      Mouth/Throat:      Mouth: Mucous membranes are moist.      Pharynx: Oropharynx is clear. No oropharyngeal exudate.   Eyes:      General: No scleral icterus.     Extraocular Movements: Extraocular movements intact.      Conjunctiva/sclera: Conjunctivae normal.      Pupils: Pupils are equal, round, and reactive to light.   Neck:      Thyroid: No thyroid mass or thyroid tenderness.      Vascular: No JVD.   Cardiovascular:      Rate and Rhythm: Normal rate.      Pulses: Normal pulses.   Pulmonary:       Effort: Pulmonary effort is normal. No respiratory distress.   Musculoskeletal:      Cervical back: Normal range of motion and neck supple. No rigidity or tenderness.   Lymphadenopathy:      Cervical: No cervical adenopathy.   Neurological:      Mental Status: He is alert and oriented to person, place, and time.   Psychiatric:         Mood and Affect: Mood normal.         Behavior: Behavior normal.         Thought Content: Thought content normal.       PHYSICAL EXAM: MODIFIED MALLAMPATI SCORE: III (soft and hard palate and base of uvula visible)  TONGUE SCALLOPING: without scalloping      RESULTS/DATA     Bicarbonate (mmol/L)   Date Value   08/18/2023 28   02/28/2023 29   09/19/2022 24     Iron (ug/dL)   Date Value   12/28/2018 104     Iron Saturation (%)   Date Value   12/28/2018 35     TIBC (ug/dL)   Date Value   12/28/2018 300     Ferritin (ug/L)   Date Value   12/28/2018 266             ASSESSMENT/PLAN     Mr. Valdez is a 69 y.o. male and He was referred to the [facility] for evaluation of BRENTON    Problem List, Orders, Assessment, Recommendations:  Problem List Items Addressed This Visit             ICD-10-CM    Benign essential hypertension I10     BP Readings from Last 1 Encounters:   02/12/24 172/86   - doing well, elevated today (typically so in doc's office) but asymptomatic  - discussed at length the impact of untreated BRENTON and BP control  - continue current management and follow-up with PCP          Obstructive sleep apnea of adult - Primary G47.33     He is looking for new sleep provider.  BRENTON diagnosed 6 years ago (AHI ~ 40)  Now on 3B Nubia with setting of 4-20 cwp  rAHI was 2, 95% pressure was 8.5 cwp  No issues  Renew supply via [employer]          Other Visit Diagnoses         Codes    BRENTON (obstructive sleep apnea)     G47.33    Relevant Orders    Positive Airway Pressure (PAP) Therapy            Disposition    Return to clinic in 12 months

## 2024-02-12 NOTE — ASSESSMENT & PLAN NOTE
He is looking for new sleep provider.  BRENTON diagnosed 6 years ago (AHI ~ 40)  Now on 3B Nubia with setting of 4-20 cwp  rAHI was 2, 95% pressure was 8.5 cwp  No issues  Renew supply via [facility]

## 2024-02-12 NOTE — ASSESSMENT & PLAN NOTE
BMI Readings from Last 1 Encounters:   02/12/24 32.39 kg/m²     - encouraged healthy weight loss via diet and exercise

## 2024-02-12 NOTE — ASSESSMENT & PLAN NOTE
BP Readings from Last 1 Encounters:   02/12/24 172/86     - doing well, elevated today (typically so in doc's office) but asymptomatic  - discussed at length the impact of untreated BRENTON and BP control  - continue current management and follow-up with PCP

## 2024-03-12 ENCOUNTER — APPOINTMENT (OUTPATIENT)
Dept: PRIMARY CARE | Facility: CLINIC | Age: 69
End: 2024-03-12
Payer: MEDICARE

## 2024-03-14 ENCOUNTER — LAB (OUTPATIENT)
Dept: LAB | Facility: LAB | Age: 69
End: 2024-03-14
Payer: MEDICARE

## 2024-03-14 ENCOUNTER — OFFICE VISIT (OUTPATIENT)
Dept: PRIMARY CARE | Facility: CLINIC | Age: 69
End: 2024-03-14
Payer: MEDICARE

## 2024-03-14 VITALS
HEIGHT: 74 IN | WEIGHT: 243.5 LBS | DIASTOLIC BLOOD PRESSURE: 75 MMHG | BODY MASS INDEX: 31.25 KG/M2 | HEART RATE: 66 BPM | SYSTOLIC BLOOD PRESSURE: 128 MMHG

## 2024-03-14 DIAGNOSIS — I10 BENIGN ESSENTIAL HYPERTENSION: ICD-10-CM

## 2024-03-14 DIAGNOSIS — G89.29 CHRONIC MIDLINE LOW BACK PAIN WITHOUT SCIATICA: ICD-10-CM

## 2024-03-14 DIAGNOSIS — M54.50 CHRONIC MIDLINE LOW BACK PAIN WITHOUT SCIATICA: ICD-10-CM

## 2024-03-14 DIAGNOSIS — E78.5 HYPERLIPIDEMIA, UNSPECIFIED HYPERLIPIDEMIA TYPE: ICD-10-CM

## 2024-03-14 DIAGNOSIS — Z00.00 MEDICARE ANNUAL WELLNESS VISIT, SUBSEQUENT: ICD-10-CM

## 2024-03-14 DIAGNOSIS — N52.9 ERECTILE DYSFUNCTION, UNSPECIFIED ERECTILE DYSFUNCTION TYPE: ICD-10-CM

## 2024-03-14 DIAGNOSIS — Z00.00 MEDICARE ANNUAL WELLNESS VISIT, SUBSEQUENT: Primary | ICD-10-CM

## 2024-03-14 LAB
ALBUMIN SERPL BCP-MCNC: 4.6 G/DL (ref 3.4–5)
ALP SERPL-CCNC: 42 U/L (ref 33–136)
ALT SERPL W P-5'-P-CCNC: 26 U/L (ref 10–52)
ANION GAP SERPL CALC-SCNC: 12 MMOL/L (ref 10–20)
AST SERPL W P-5'-P-CCNC: 21 U/L (ref 9–39)
BILIRUB SERPL-MCNC: 1.3 MG/DL (ref 0–1.2)
BUN SERPL-MCNC: 21 MG/DL (ref 6–23)
CALCIUM SERPL-MCNC: 9.7 MG/DL (ref 8.6–10.6)
CHLORIDE SERPL-SCNC: 105 MMOL/L (ref 98–107)
CHOLEST SERPL-MCNC: 162 MG/DL (ref 0–199)
CHOLESTEROL/HDL RATIO: 3.9
CO2 SERPL-SCNC: 28 MMOL/L (ref 21–32)
CREAT SERPL-MCNC: 1.09 MG/DL (ref 0.5–1.3)
EGFRCR SERPLBLD CKD-EPI 2021: 74 ML/MIN/1.73M*2
EST. AVERAGE GLUCOSE BLD GHB EST-MCNC: 94 MG/DL
GLUCOSE SERPL-MCNC: 102 MG/DL (ref 74–99)
HBA1C MFR BLD: 4.9 %
HDLC SERPL-MCNC: 41.5 MG/DL
LDLC SERPL CALC-MCNC: 82 MG/DL
NON HDL CHOLESTEROL: 121 MG/DL (ref 0–149)
POTASSIUM SERPL-SCNC: 4 MMOL/L (ref 3.5–5.3)
PROT SERPL-MCNC: 7 G/DL (ref 6.4–8.2)
PSA SERPL-MCNC: 0.68 NG/ML
SODIUM SERPL-SCNC: 141 MMOL/L (ref 136–145)
TRIGL SERPL-MCNC: 193 MG/DL (ref 0–149)
VLDL: 39 MG/DL (ref 0–40)

## 2024-03-14 PROCEDURE — 1160F RVW MEDS BY RX/DR IN RCRD: CPT | Performed by: INTERNAL MEDICINE

## 2024-03-14 PROCEDURE — 80053 COMPREHEN METABOLIC PANEL: CPT

## 2024-03-14 PROCEDURE — 3074F SYST BP LT 130 MM HG: CPT | Performed by: INTERNAL MEDICINE

## 2024-03-14 PROCEDURE — 1123F ACP DISCUSS/DSCN MKR DOCD: CPT | Performed by: INTERNAL MEDICINE

## 2024-03-14 PROCEDURE — 84153 ASSAY OF PSA TOTAL: CPT

## 2024-03-14 PROCEDURE — 1157F ADVNC CARE PLAN IN RCRD: CPT | Performed by: INTERNAL MEDICINE

## 2024-03-14 PROCEDURE — 3078F DIAST BP <80 MM HG: CPT | Performed by: INTERNAL MEDICINE

## 2024-03-14 PROCEDURE — 1158F ADVNC CARE PLAN TLK DOCD: CPT | Performed by: INTERNAL MEDICINE

## 2024-03-14 PROCEDURE — 99213 OFFICE O/P EST LOW 20 MIN: CPT | Performed by: INTERNAL MEDICINE

## 2024-03-14 PROCEDURE — 36415 COLL VENOUS BLD VENIPUNCTURE: CPT

## 2024-03-14 PROCEDURE — 80061 LIPID PANEL: CPT

## 2024-03-14 PROCEDURE — 1036F TOBACCO NON-USER: CPT | Performed by: INTERNAL MEDICINE

## 2024-03-14 PROCEDURE — 1159F MED LIST DOCD IN RCRD: CPT | Performed by: INTERNAL MEDICINE

## 2024-03-14 PROCEDURE — 83036 HEMOGLOBIN GLYCOSYLATED A1C: CPT

## 2024-03-14 PROCEDURE — G0439 PPPS, SUBSEQ VISIT: HCPCS | Performed by: INTERNAL MEDICINE

## 2024-03-14 PROCEDURE — 1170F FXNL STATUS ASSESSED: CPT | Performed by: INTERNAL MEDICINE

## 2024-03-14 PROCEDURE — 99497 ADVNCD CARE PLAN 30 MIN: CPT | Performed by: INTERNAL MEDICINE

## 2024-03-14 RX ORDER — ATORVASTATIN CALCIUM 40 MG/1
40 TABLET, FILM COATED ORAL NIGHTLY
Qty: 90 TABLET | Refills: 3 | Status: SHIPPED | OUTPATIENT
Start: 2024-03-14

## 2024-03-14 RX ORDER — LISINOPRIL 20 MG/1
20 TABLET ORAL DAILY
Qty: 90 TABLET | Refills: 3 | Status: SHIPPED | OUTPATIENT
Start: 2024-03-14

## 2024-03-14 RX ORDER — SILDENAFIL 50 MG/1
50 TABLET, FILM COATED ORAL DAILY PRN
Qty: 12 TABLET | Refills: 3 | Status: SHIPPED | OUTPATIENT
Start: 2024-03-14 | End: 2025-03-14

## 2024-03-14 ASSESSMENT — ENCOUNTER SYMPTOMS
OCCASIONAL FEELINGS OF UNSTEADINESS: 0
LOSS OF SENSATION IN FEET: 0
FATIGUE: 0
UNEXPECTED WEIGHT CHANGE: 0
DEPRESSION: 0

## 2024-03-14 ASSESSMENT — PATIENT HEALTH QUESTIONNAIRE - PHQ9
2. FEELING DOWN, DEPRESSED OR HOPELESS: NOT AT ALL
SUM OF ALL RESPONSES TO PHQ9 QUESTIONS 1 AND 2: 0
1. LITTLE INTEREST OR PLEASURE IN DOING THINGS: NOT AT ALL

## 2024-03-14 ASSESSMENT — ACTIVITIES OF DAILY LIVING (ADL)
DRESSING: INDEPENDENT
DOING_HOUSEWORK: INDEPENDENT
MANAGING_FINANCES: INDEPENDENT
GROCERY_SHOPPING: INDEPENDENT
TAKING_MEDICATION: INDEPENDENT
BATHING: INDEPENDENT

## 2024-03-14 NOTE — PROGRESS NOTES
"Subjective   Reason for Visit: Anthony Valdez is an 69 y.o. male here for a Medicare Wellness visit.     Past Medical, Surgical, and Family History reviewed and updated in chart.    Reviewed all medications by prescribing practitioner or clinical pharmacist (such as prescriptions, OTCs, herbal therapies and supplements) and documented in the medical record.    Has had pain in his tailbone. Feels like it started w/ rowing. Has pain when sitting for too long. Whenever it occurs, he will stop cycling for a few weeks. Pain improves, but gradually returns when he resumes cycling. Takes advil when pain is bad enough. Got a thicker seat for his rowing machine in the last week which has seemed to help.    Has had issues w/ ED for last 6-9 months. Is able to get an erection, but it doesn't last.           Patient Care Team:  Asia Dasilva MD as PCP - General (Internal Medicine)  Daniela Hill MD as PCP - [employer] PCP     Review of Systems   Constitutional:  Negative for fatigue and unexpected weight change.       Objective   Vitals:  /75   Pulse 66   Ht 1.87 m (6' 1.62\")   Wt 110 kg (243 lb 8 oz)   BMI 31.59 kg/m²       Physical Exam  Constitutional:       General: He is not in acute distress.     Appearance: He is not ill-appearing, toxic-appearing or diaphoretic.   HENT:      Head: Normocephalic and atraumatic.   Eyes:      Conjunctiva/sclera: Conjunctivae normal.   Cardiovascular:      Rate and Rhythm: Normal rate and regular rhythm.      Heart sounds: No murmur heard.     No friction rub. No gallop.   Pulmonary:      Effort: Pulmonary effort is normal. No respiratory distress.      Breath sounds: No stridor. No wheezing, rhonchi or rales.   Neurological:      Mental Status: He is alert.         Assessment/Plan   Problem List Items Addressed This Visit       Benign essential hypertension    Overview     -Controlled on lisinopril 20mg daily.         Current Assessment & Plan     -BP improved " on repeat check. Lisinopril 20mg daily refilled.         Relevant Medications    lisinopril 20 mg tablet    Hyperlipidemia    Relevant Medications    atorvastatin (Lipitor) 40 mg tablet    Erectile dysfunction    Current Assessment & Plan     -New for pt. Reviewed risk factors. Pt agreeable w/ starting medication for it. Reviewed possible side effects.         Relevant Medications    sildenafil (Viagra) 50 mg tablet     Other Visit Diagnoses       Medicare annual wellness visit, subsequent    -  Primary    Relevant Orders    Comprehensive metabolic panel    PSA    Hemoglobin A1c    Lipid panel    Chronic midline low back pain without sciatica              Advance Directives Discussion  Advanced Care Planning (including a Living Will, Healthcare POA, as well as specific end of life choices and/or directives), was discussed with the patient and/or surrogate, voluntarily, and details of that discussion documented in the Problem List (under Advanced Directives Discussion) of the medical record.   -Pt wants to be full code. Has his wife as his HCPOA and daughter as first alternative agent. Will bring in his paperwork from home when time is permitting.   (~16 min spent discussing above)

## 2024-03-14 NOTE — ASSESSMENT & PLAN NOTE
-New for pt. Reviewed risk factors. Pt agreeable w/ starting medication for it. Reviewed possible side effects.

## 2024-04-23 ENCOUNTER — ALLIED HEALTH (OUTPATIENT)
Dept: INTEGRATIVE MEDICINE | Facility: CLINIC | Age: 69
End: 2024-04-23
Payer: MEDICARE

## 2024-04-23 DIAGNOSIS — M54.2 NECK PAIN: ICD-10-CM

## 2024-04-23 DIAGNOSIS — M99.03 SOMATIC DYSFUNCTION OF LUMBAR REGION: ICD-10-CM

## 2024-04-23 DIAGNOSIS — M99.01 CERVICAL SEGMENT DYSFUNCTION: Primary | ICD-10-CM

## 2024-04-23 DIAGNOSIS — M99.02 SOMATIC DYSFUNCTION OF THORACIC REGION: ICD-10-CM

## 2024-04-23 PROCEDURE — 98941 CHIROPRACT MANJ 3-4 REGIONS: CPT | Performed by: CHIROPRACTOR

## 2024-04-23 ASSESSMENT — ENCOUNTER SYMPTOMS
FEVER: 0
DIZZINESS: 0
DIFFICULTY URINATING: 0
SHORTNESS OF BREATH: 0
AGITATION: 0
COLOR CHANGE: 0
DIARRHEA: 0
NAUSEA: 0
VOMITING: 0
DYSURIA: 0

## 2024-04-23 NOTE — PROGRESS NOTES
Assessment/Plan   The patient's R>L upper back/neck pain appears myofascial, with possibility of mild radiculopathy of lower CS. There is some limitation of cervical spine range of motion however there are no red flags. Treatment will involve soft tissue manipulation, spinal manipulation, and we reviewed stretches to do at home including active rotation and lateral flexion to improve his cervical spine mobility. Could consider CS radiographs pending trial of care    Visits this year: 1    Subjective     HPI - 4/23/24 R upper back / neck pain -patient reports 1 week history of intermittent right-sided cervical thoracic pain and tightness focused in the upper trapezius region.  Pain ranges from 1-7 out of 10 intensity and is frequent.  Notes limited neck mobility.  Notes symptoms began after pushing a pile of wood.  Has had episodes of neck pain and upper back pain and tightness over the past few years that come and go typically self resolving.  He had symptoms on the left side like this in 20 20-20 21 that were alleviated after coming to our office and getting chiropractic treatment.  No radiating pain numbness or tingling further distal in the upper extremity    Review of Systems   Constitutional:  Negative for fever.   Eyes:  Negative for visual disturbance.   Respiratory:  Negative for shortness of breath.    Cardiovascular:  Negative for chest pain.   Gastrointestinal:  Negative for diarrhea, nausea and vomiting.   Genitourinary:  Negative for difficulty urinating and dysuria.   Skin:  Negative for color change.   Neurological:  Negative for dizziness.   Psychiatric/Behavioral:  Negative for agitation.    All other systems reviewed and are negative.    Objective   Examination findings (e.g., palpation & ROM): Dec CS ROM BL with increased pain/tightness in lower neck  HTN/tender upper trapezius & CS erectors     Segmental joint dysfunction was identified in the following areas using motion palpation and/or pain  provocation assessment:  Cervical: 7  Thoracic: 1-3, 4-6  Lumbopelvic:  1-2    Physical Exam  Neurological:      Mental Status: He is alert.      Sensory: Sensation is intact.      Motor: Motor function is intact.      Deep Tendon Reflexes:      Reflex Scores:       Tricep reflexes are 1+ on the right side and 2+ on the left side.       Bicep reflexes are 2+ on the right side and 2+ on the left side.       Brachioradialis reflexes are 2+ on the right side and 2+ on the left side.     Comments: UE str wnl 4/23/24       Plan   Today's treatment:  STM to patient tolerance to hypertonic paraspinal muscles  SMT to regions of segmental dysfunction identified on exam, using age-appropriate force, and manual diversified technique. Low force on CS  Manual dynamic stretching of the  upper trapezius BL  Patient noted improved mobility and reduced pain post-treatment    Treatment Plan:   The patient and I discussed the risks and benefits of chiropractic care. Based on the patient's subjective complaints along with the examination findings, it is advised that a course of chiropractic treatment be initiated. The patient provided consent for care. The patient tolerated today's treatment with little or no additional discomfort and was instructed to contact the office for questions or concerns. Will see patient once per week then every 2 weeks when symptoms become mild/manageable, further spaced apart contingent upon improvement.     This chart note was generated using dictation software, and as such, there may be typographical errors present. Abbreviations: Cervical spine (CS), cervical-thoracic (CT), Dry needling (DN), Flexion adduction internal rotation (FAIR), high velocity, low amplitude (HVLA), Lumbar spine (LS), Soft tissue manipulation (STM), spinal manipulative therapy (SMT), Straight leg raise (SLR), Thoracic spine (TS).

## 2024-04-26 ASSESSMENT — ENCOUNTER SYMPTOMS
COLOR CHANGE: 0
SHORTNESS OF BREATH: 0
FEVER: 0
NAUSEA: 0
DIZZINESS: 0
DYSURIA: 0
VOMITING: 0
DIFFICULTY URINATING: 0
AGITATION: 0
DIARRHEA: 0

## 2024-04-26 NOTE — PROGRESS NOTES
Assessment/Plan   The patient's R>L upper back/neck pain appears myofascial, with possibility of mild radiculopathy of lower CS. There is some limitation of cervical spine range of motion however there are no red flags. Treatment will involve soft tissue manipulation, spinal manipulation, and we reviewed stretches to do at home including active rotation and lateral flexion to improve his cervical spine mobility. Could consider CS radiographs pending trial of care    Visits this year: 2    Subjective   Patient reports that he is doing better overall with mild intermittent pain and tightness 0-2 out of 10 intensity in the right side of the base of his neck with occasional episodes of moderate pain after mowing the lawn or when sleeping.  In general notes more range of motion has been stretching his neck regularly as advised    HPI - 4/23/24 R upper back / neck pain -patient reports 1 week history of intermittent right-sided cervical thoracic pain and tightness focused in the upper trapezius region.  Pain ranges from 1-7 out of 10 intensity and is frequent.  Notes limited neck mobility.  Notes symptoms began after pushing a pile of wood.  Has had episodes of neck pain and upper back pain and tightness over the past few years that come and go typically self resolving.  He had symptoms on the left side like this in 20 20-20 21 that were alleviated after coming to our office and getting chiropractic treatment.  No radiating pain numbness or tingling further distal in the upper extremity    Review of Systems   Constitutional:  Negative for fever.   Eyes:  Negative for visual disturbance.   Respiratory:  Negative for shortness of breath.    Cardiovascular:  Negative for chest pain.   Gastrointestinal:  Negative for diarrhea, nausea and vomiting.   Genitourinary:  Negative for difficulty urinating and dysuria.   Skin:  Negative for color change.   Neurological:  Negative for dizziness.   Psychiatric/Behavioral:  Negative for  agitation.    All other systems reviewed and are negative.    Objective   Examination findings (e.g., palpation & ROM): Dec CS ROM BL with increased pain/tightness in lower neck  HTN/tender upper trapezius & CS erectors     Segmental joint dysfunction was identified in the following areas using motion palpation and/or pain provocation assessment:  Cervical: 7  Thoracic: 1-3, 4-6  Lumbopelvic:  1-2    Physical Exam  Neurological:      Mental Status: He is alert.      Sensory: Sensation is intact.      Motor: Motor function is intact.      Deep Tendon Reflexes:      Reflex Scores:       Tricep reflexes are 1+ on the right side and 2+ on the left side.       Bicep reflexes are 2+ on the right side and 2+ on the left side.       Brachioradialis reflexes are 2+ on the right side and 2+ on the left side.     Comments: UE str wnl 4/23/24       Plan   Today's treatment:  STM to patient tolerance to hypertonic paraspinal muscles  SMT to regions of segmental dysfunction identified on exam, using age-appropriate force, and manual diversified technique. Low force on CS  Manual dynamic stretching of the  upper trapezius BL 5m  Patient noted improved mobility and reduced pain post-treatment    Treatment Plan:   The patient and I discussed the risks and benefits of chiropractic care. Based on the patient's subjective complaints along with the examination findings, it is advised that a course of chiropractic treatment be initiated. The patient provided consent for care. The patient tolerated today's treatment with little or no additional discomfort and was instructed to contact the office for questions or concerns. Will see patient once per week then every 2 weeks when symptoms become mild/manageable, further spaced apart contingent upon improvement.     This chart note was generated using dictation software, and as such, there may be typographical errors present. Abbreviations: Cervical spine (CS), cervical-thoracic (CT), Dry  needling (DN), Flexion adduction internal rotation (FAIR), high velocity, low amplitude (HVLA), Lumbar spine (LS), Soft tissue manipulation (STM), spinal manipulative therapy (SMT), Straight leg raise (SLR), Thoracic spine (TS).

## 2024-04-30 ENCOUNTER — ALLIED HEALTH (OUTPATIENT)
Dept: INTEGRATIVE MEDICINE | Facility: CLINIC | Age: 69
End: 2024-04-30
Payer: MEDICARE

## 2024-04-30 DIAGNOSIS — M99.02 SOMATIC DYSFUNCTION OF THORACIC REGION: ICD-10-CM

## 2024-04-30 DIAGNOSIS — M99.03 SOMATIC DYSFUNCTION OF LUMBAR REGION: ICD-10-CM

## 2024-04-30 DIAGNOSIS — M54.2 NECK PAIN: ICD-10-CM

## 2024-04-30 DIAGNOSIS — M99.01 CERVICAL SEGMENT DYSFUNCTION: Primary | ICD-10-CM

## 2024-04-30 PROCEDURE — 98941 CHIROPRACT MANJ 3-4 REGIONS: CPT | Performed by: CHIROPRACTOR

## 2024-05-08 ASSESSMENT — ENCOUNTER SYMPTOMS
COLOR CHANGE: 0
DIFFICULTY URINATING: 0
DYSURIA: 0
SHORTNESS OF BREATH: 0
NAUSEA: 0
AGITATION: 0
DIARRHEA: 0
VOMITING: 0
DIZZINESS: 0
FEVER: 0

## 2024-05-08 NOTE — PROGRESS NOTES
Assessment/Plan   The patient's R>L upper back/neck pain appears myofascial, with possibility of mild radiculopathy of lower CS. There is some limitation of cervical spine range of motion however there are no red flags. Treatment will involve soft tissue manipulation, spinal manipulation, and we reviewed stretches to do at home including active rotation and lateral flexion to improve his cervical spine mobility. Could consider CS radiographs pending trial of care. Added dry needling to care 5/9/24 with good result.    Visits this year: 3    Subjective   Patient reports overall gradual improvements.  Pain fluctuates day-to-day and also time of day in general it has been better but still has flares of mild pain and catching in the right lower neck and upper trapezius area.  This can occur when turning his neck or after mowing the lawn.  Pain is currently mild in intensity and intermittent    HPI - 4/23/24 R upper back / neck pain -patient reports 1 week history of intermittent right-sided cervical thoracic pain and tightness focused in the upper trapezius region.  Pain ranges from 1-7 out of 10 intensity and is frequent.  Notes limited neck mobility.  Notes symptoms began after pushing a pile of wood.  Has had episodes of neck pain and upper back pain and tightness over the past few years that come and go typically self resolving.  He had symptoms on the left side like this in 20 20-20 21 that were alleviated after coming to our office and getting chiropractic treatment.  No radiating pain numbness or tingling further distal in the upper extremity    Review of Systems   Constitutional:  Negative for fever.   Eyes:  Negative for visual disturbance.   Respiratory:  Negative for shortness of breath.    Cardiovascular:  Negative for chest pain.   Gastrointestinal:  Negative for diarrhea, nausea and vomiting.   Genitourinary:  Negative for difficulty urinating and dysuria.   Skin:  Negative for color change.    Neurological:  Negative for dizziness.   Psychiatric/Behavioral:  Negative for agitation.    All other systems reviewed and are negative.    Objective   Examination findings (e.g., palpation & ROM): Dec CS ROM BL with increased pain/tightness in lower neck  HTN/tender upper trapezius & CS erectors     Segmental joint dysfunction was identified in the following areas using motion palpation and/or pain provocation assessment:  Cervical: 7  Thoracic: 1-3, 4-6  Lumbopelvic:  1-2    Physical Exam  Neurological:      Mental Status: He is alert.      Sensory: Sensation is intact.      Motor: Motor function is intact.      Deep Tendon Reflexes:      Reflex Scores:       Tricep reflexes are 1+ on the right side and 2+ on the left side.       Bicep reflexes are 2+ on the right side and 2+ on the left side.       Brachioradialis reflexes are 2+ on the right side and 2+ on the left side.     Comments: UE str wnl 4/23/24       Plan   Today's treatment:  STM to patient tolerance to hypertonic paraspinal muscles  SMT to regions of segmental dysfunction identified on exam, using age-appropriate force, and manual diversified technique. Low force on CS  Dry needling (10 in, 10 out) to region of the chief complaint / hypertonic muscles identified upon palpation in R upper trapezius and CS erectors  Manual dynamic stretching of the  upper trapezius BL 5m  Patient noted improved mobility and reduced pain post-treatment    Treatment Plan:   The patient and I discussed the risks and benefits of chiropractic care. Based on the patient's subjective complaints along with the examination findings, it is advised that a course of chiropractic treatment be initiated. The patient provided consent for care. The patient tolerated today's treatment with little or no additional discomfort and was instructed to contact the office for questions or concerns. Will see patient once per week then every 2 weeks when symptoms become mild/manageable,  further spaced apart contingent upon improvement.     This chart note was generated using dictation software, and as such, there may be typographical errors present. Abbreviations: Cervical spine (CS), cervical-thoracic (CT), Dry needling (DN), Flexion adduction internal rotation (FAIR), high velocity, low amplitude (HVLA), Lumbar spine (LS), Soft tissue manipulation (STM), spinal manipulative therapy (SMT), Straight leg raise (SLR), Thoracic spine (TS).

## 2024-05-09 ENCOUNTER — ALLIED HEALTH (OUTPATIENT)
Dept: INTEGRATIVE MEDICINE | Facility: CLINIC | Age: 69
End: 2024-05-09
Payer: MEDICARE

## 2024-05-09 DIAGNOSIS — M99.01 CERVICAL SEGMENT DYSFUNCTION: Primary | ICD-10-CM

## 2024-05-09 DIAGNOSIS — M54.2 NECK PAIN: ICD-10-CM

## 2024-05-09 DIAGNOSIS — M99.03 SOMATIC DYSFUNCTION OF LUMBAR REGION: ICD-10-CM

## 2024-05-09 DIAGNOSIS — M99.02 SOMATIC DYSFUNCTION OF THORACIC REGION: ICD-10-CM

## 2024-05-09 PROCEDURE — 98941 CHIROPRACT MANJ 3-4 REGIONS: CPT | Performed by: CHIROPRACTOR

## 2024-05-10 ASSESSMENT — ENCOUNTER SYMPTOMS
DIFFICULTY URINATING: 0
FEVER: 0
NAUSEA: 0
DYSURIA: 0
DIZZINESS: 0
COLOR CHANGE: 0
SHORTNESS OF BREATH: 0
AGITATION: 0
VOMITING: 0
DIARRHEA: 0

## 2024-05-10 NOTE — PROGRESS NOTES
Assessment/Plan   The patient's R>L upper back/neck pain appears myofascial, with possibility of mild radiculopathy of lower CS. There is some limitation of cervical spine range of motion however there are no red flags. Treatment will involve soft tissue manipulation, spinal manipulation, and we reviewed stretches to do at home including active rotation and lateral flexion to improve his cervical spine mobility. Could consider CS radiographs pending trial of care. Added dry needling to care 5/9/24 with good result.    Visits this year: 4    Subjective   Patient reports significant improvement since last visit with only mild occasional tightness in the right side of the base of the neck.  He notes that symptom relief was immediate after last visit and has continued since.  Has been able to mow the lawn and do other activities such as rowing exercise without increase in pain.    HPI - 4/23/24 R upper back / neck pain -patient reports 1 week history of intermittent right-sided cervical thoracic pain and tightness focused in the upper trapezius region.  Pain ranges from 1-7 out of 10 intensity and is frequent.  Notes limited neck mobility.  Notes symptoms began after pushing a pile of wood.  Has had episodes of neck pain and upper back pain and tightness over the past few years that come and go typically self resolving.  He had symptoms on the left side like this in 20 20-20 21 that were alleviated after coming to our office and getting chiropractic treatment.  No radiating pain numbness or tingling further distal in the upper extremity    Review of Systems   Constitutional:  Negative for fever.   Eyes:  Negative for visual disturbance.   Respiratory:  Negative for shortness of breath.    Cardiovascular:  Negative for chest pain.   Gastrointestinal:  Negative for diarrhea, nausea and vomiting.   Genitourinary:  Negative for difficulty urinating and dysuria.   Skin:  Negative for color change.   Neurological:  Negative  for dizziness.   Psychiatric/Behavioral:  Negative for agitation.    All other systems reviewed and are negative.    Objective   Examination findings (e.g., palpation & ROM): Dec CS ROM BL with increased pain/tightness in lower neck  HTN/tender upper trapezius & CS erectors (mild R>L)    Segmental joint dysfunction was identified in the following areas using motion palpation and/or pain provocation assessment:  Cervical: 7  Thoracic: 1-3, 4-5  Lumbopelvic:  1-2    Physical Exam  Neurological:      Mental Status: He is alert.      Sensory: Sensation is intact.      Motor: Motor function is intact.      Deep Tendon Reflexes:      Reflex Scores:       Tricep reflexes are 1+ on the right side and 2+ on the left side.       Bicep reflexes are 2+ on the right side and 2+ on the left side.       Brachioradialis reflexes are 2+ on the right side and 2+ on the left side.     Comments: UE str wnl 4/23/24       Plan   Today's treatment:  STM to patient tolerance to hypertonic paraspinal muscles  SMT to regions of segmental dysfunction identified on exam, using age-appropriate force, and manual diversified technique. Low force on CS  Dry needling (10 in, 10 out) to region of the chief complaint / hypertonic muscles identified upon palpation in R upper trapezius and CS erectors  Manual dynamic stretching of the  upper trapezius BL 5m  Patient noted improved mobility post-treatment    Treatment Plan:   The patient and I discussed the risks and benefits of chiropractic care. Based on the patient's subjective complaints along with the examination findings, it is advised that a course of chiropractic treatment be initiated. The patient provided consent for care. The patient tolerated today's treatment with little or no additional discomfort and was instructed to contact the office for questions or concerns. Will see patient once per week then every 2 weeks when symptoms become mild/manageable, further spaced apart contingent upon  improvement.     This chart note was generated using dictation software, and as such, there may be typographical errors present. Abbreviations: Cervical spine (CS), cervical-thoracic (CT), Dry needling (DN), Flexion adduction internal rotation (FAIR), high velocity, low amplitude (HVLA), Lumbar spine (LS), Soft tissue manipulation (STM), spinal manipulative therapy (SMT), Straight leg raise (SLR), Thoracic spine (TS).

## 2024-05-14 ENCOUNTER — ALLIED HEALTH (OUTPATIENT)
Dept: INTEGRATIVE MEDICINE | Facility: CLINIC | Age: 69
End: 2024-05-14
Payer: MEDICARE

## 2024-05-14 DIAGNOSIS — M54.2 NECK PAIN: ICD-10-CM

## 2024-05-14 DIAGNOSIS — M99.03 SOMATIC DYSFUNCTION OF LUMBAR REGION: ICD-10-CM

## 2024-05-14 DIAGNOSIS — M99.02 SOMATIC DYSFUNCTION OF THORACIC REGION: ICD-10-CM

## 2024-05-14 DIAGNOSIS — M99.01 CERVICAL SEGMENT DYSFUNCTION: Primary | ICD-10-CM

## 2024-05-14 PROCEDURE — 98941 CHIROPRACT MANJ 3-4 REGIONS: CPT | Performed by: CHIROPRACTOR

## 2024-09-17 ENCOUNTER — APPOINTMENT (OUTPATIENT)
Dept: PRIMARY CARE | Facility: CLINIC | Age: 69
End: 2024-09-17
Payer: MEDICARE

## 2024-09-17 VITALS
WEIGHT: 238.2 LBS | OXYGEN SATURATION: 96 % | SYSTOLIC BLOOD PRESSURE: 121 MMHG | BODY MASS INDEX: 30.9 KG/M2 | DIASTOLIC BLOOD PRESSURE: 60 MMHG | HEART RATE: 48 BPM

## 2024-09-17 DIAGNOSIS — N52.9 ERECTILE DYSFUNCTION, UNSPECIFIED ERECTILE DYSFUNCTION TYPE: Primary | ICD-10-CM

## 2024-09-17 PROBLEM — I47.10 PAROXYSMAL SVT (SUPRAVENTRICULAR TACHYCARDIA) (CMS-HCC): Status: RESOLVED | Noted: 2023-07-21 | Resolved: 2024-09-17

## 2024-09-17 PROCEDURE — 1123F ACP DISCUSS/DSCN MKR DOCD: CPT | Performed by: INTERNAL MEDICINE

## 2024-09-17 PROCEDURE — 1159F MED LIST DOCD IN RCRD: CPT | Performed by: INTERNAL MEDICINE

## 2024-09-17 PROCEDURE — 1036F TOBACCO NON-USER: CPT | Performed by: INTERNAL MEDICINE

## 2024-09-17 PROCEDURE — 3074F SYST BP LT 130 MM HG: CPT | Performed by: INTERNAL MEDICINE

## 2024-09-17 PROCEDURE — 1157F ADVNC CARE PLAN IN RCRD: CPT | Performed by: INTERNAL MEDICINE

## 2024-09-17 PROCEDURE — 3078F DIAST BP <80 MM HG: CPT | Performed by: INTERNAL MEDICINE

## 2024-09-17 PROCEDURE — 99213 OFFICE O/P EST LOW 20 MIN: CPT | Performed by: INTERNAL MEDICINE

## 2024-09-17 RX ORDER — TADALAFIL 5 MG/1
5 TABLET ORAL DAILY PRN
Qty: 12 TABLET | Refills: 3 | Status: SHIPPED | OUTPATIENT
Start: 2024-09-17

## 2024-09-17 ASSESSMENT — PATIENT HEALTH QUESTIONNAIRE - PHQ9
1. LITTLE INTEREST OR PLEASURE IN DOING THINGS: NOT AT ALL
SUM OF ALL RESPONSES TO PHQ9 QUESTIONS 1 AND 2: 0
2. FEELING DOWN, DEPRESSED OR HOPELESS: NOT AT ALL

## 2024-09-17 ASSESSMENT — ENCOUNTER SYMPTOMS: HEADACHES: 1

## 2024-09-17 NOTE — ASSESSMENT & PLAN NOTE
-Will change from viagra to cialis. If medication too expensive then pt to let us know and we will change back.

## 2024-09-17 NOTE — PROGRESS NOTES
"Subjective   Patient ID: Anthony Valdez \"Mike\" is a 69 y.o. male who presents for Follow-up (Had COVID in July ).    Has been getting headaches when he takes viagra. They get better with ibuprofen. Tried lower dose of viagra but it didn't work as well. Otherwise feels he is doing well.        Review of Systems   Neurological:  Positive for headaches (medication side effect).       /60   Pulse (!) 48   Wt 108 kg (238 lb 3.2 oz)   SpO2 96%   BMI 30.90 kg/m²   Objective   Physical Exam  Constitutional:       General: He is not in acute distress.     Appearance: He is obese. He is not ill-appearing, toxic-appearing or diaphoretic.   HENT:      Head: Normocephalic and atraumatic.   Eyes:      Conjunctiva/sclera: Conjunctivae normal.   Neurological:      Mental Status: He is alert.         Assessment/Plan   Problem List Items Addressed This Visit             ICD-10-CM    Erectile dysfunction - Primary N52.9     -Will change from viagra to cialis. If medication too expensive then pt to let us know and we will change back.         Relevant Medications    tadalafil (Cialis) 5 mg tablet   -Reviewed w/ pt he is overall doing well with his health. Biggest thing he can continue to do to help is to lose weight. Goal 10-15 lb weight loss before next appt. Pt agreeable.  -Plans to get COVID and flu shots before a trip he has in the next month.  -MAW in 6 months.         Asia Dasilva MD 09/17/24 9:39 AM   "

## 2024-09-26 ENCOUNTER — TELEPHONE (OUTPATIENT)
Dept: PRIMARY CARE | Facility: CLINIC | Age: 69
End: 2024-09-26
Payer: MEDICARE

## 2024-11-13 ASSESSMENT — ENCOUNTER SYMPTOMS
DIZZINESS: 0
DYSURIA: 0
DIARRHEA: 0
DIFFICULTY URINATING: 0
NAUSEA: 0
VOMITING: 0
COLOR CHANGE: 0
FEVER: 0
AGITATION: 0
SHORTNESS OF BREATH: 0

## 2024-11-13 NOTE — PROGRESS NOTES
Assessment/Plan   The patient's R>L upper back/neck pain appears myofascial, with possibility of mild radiculopathy of lower CS. There is some limitation of cervical spine range of motion however there are no red flags. Treatment will involve soft tissue manipulation, spinal manipulation, and we reviewed stretches to do at home including active rotation and lateral flexion to improve his cervical spine mobility. Could consider CS radiographs pending trial of care. Added dry needling to care 5/9/24 with good result.    Visits this year: 5    Subjective   Patient reports a return of mild to moderate intensity neck/upper back pain and tightness, R>L. This exacerbation started about two weeks ago after playing with his grand kids but no trauma. Denies radiation, numbness, tingling.     HPI - 4/23/24 R upper back / neck pain -patient reports 1 week history of intermittent right-sided cervical thoracic pain and tightness focused in the upper trapezius region.  Pain ranges from 1-7 out of 10 intensity and is frequent.  Notes limited neck mobility.  Notes symptoms began after pushing a pile of wood.  Has had episodes of neck pain and upper back pain and tightness over the past few years that come and go typically self resolving.  He had symptoms on the left side like this in 20 20-20 21 that were alleviated after coming to our office and getting chiropractic treatment.  No radiating pain numbness or tingling further distal in the upper extremity    Review of Systems   Constitutional:  Negative for fever.   Eyes:  Negative for visual disturbance.   Respiratory:  Negative for shortness of breath.    Cardiovascular:  Negative for chest pain.   Gastrointestinal:  Negative for diarrhea, nausea and vomiting.   Genitourinary:  Negative for difficulty urinating and dysuria.   Skin:  Negative for color change.   Neurological:  Negative for dizziness.   Psychiatric/Behavioral:  Negative for agitation.    All other systems reviewed  and are negative.    Objective   Examination findings (e.g., palpation & ROM): Dec CS ROM BL with increased pain/tightness in lower neck  HTN/tender upper trapezius & CS erectors (mild R>L)    Segmental joint dysfunction was identified in the following areas using motion palpation and/or pain provocation assessment:  Cervical: 7  Thoracic: 1-3, 4-5  Lumbopelvic:  1-2    Physical Exam  Neurological:      Mental Status: He is alert.      Sensory: Sensation is intact.      Motor: Motor function is intact.      Deep Tendon Reflexes:      Reflex Scores:       Tricep reflexes are 2+ on the right side and 2+ on the left side.       Bicep reflexes are 2+ on the right side and 2+ on the left side.       Brachioradialis reflexes are 2+ on the right side and 2+ on the left side.     Comments: UE str WNL  11/14/24       Plan   Today's treatment:  STM to patient tolerance to hypertonic paraspinal muscles  SMT to regions of segmental dysfunction identified on exam, using age-appropriate force, and manual diversified technique. Low force on CS  Dry needling (10 in, 10 out) to region of the chief complaint / hypertonic muscles identified upon palpation in R upper trapezius and CS erectors  Manual dynamic stretching of the  upper trapezius BL 5m  Patient noted improved mobility post-treatment    Treatment Plan:   The patient and I discussed the risks and benefits of chiropractic care. Based on the patient's subjective complaints along with the examination findings, it is advised that a course of chiropractic treatment be initiated. The patient provided consent for care. The patient tolerated today's treatment with little or no additional discomfort and was instructed to contact the office for questions or concerns. Will see patient once per week then every 2 weeks when symptoms become mild/manageable, further spaced apart contingent upon improvement.     This chart note was generated using dictation software, and as such, there may  be typographical errors present. Abbreviations: Cervical spine (CS), cervical-thoracic (CT), Dry needling (DN), Flexion adduction internal rotation (FAIR), high velocity, low amplitude (HVLA), Lumbar spine (LS), Soft tissue manipulation (STM), spinal manipulative therapy (SMT), Straight leg raise (SLR), Thoracic spine (TS).

## 2024-11-14 ENCOUNTER — ALLIED HEALTH (OUTPATIENT)
Dept: INTEGRATIVE MEDICINE | Facility: CLINIC | Age: 69
End: 2024-11-14
Payer: MEDICARE

## 2024-11-14 DIAGNOSIS — M99.01 CERVICAL SEGMENT DYSFUNCTION: Primary | ICD-10-CM

## 2024-11-14 DIAGNOSIS — M99.03 SOMATIC DYSFUNCTION OF LUMBAR REGION: ICD-10-CM

## 2024-11-14 DIAGNOSIS — M54.2 NECK PAIN: ICD-10-CM

## 2024-11-14 DIAGNOSIS — M99.02 SOMATIC DYSFUNCTION OF THORACIC REGION: ICD-10-CM

## 2024-11-14 PROCEDURE — 98941 CHIROPRACT MANJ 3-4 REGIONS: CPT | Performed by: CHIROPRACTOR

## 2024-11-26 ENCOUNTER — ALLIED HEALTH (OUTPATIENT)
Dept: INTEGRATIVE MEDICINE | Facility: CLINIC | Age: 69
End: 2024-11-26
Payer: MEDICARE

## 2024-11-26 DIAGNOSIS — M99.01 CERVICAL SEGMENT DYSFUNCTION: Primary | ICD-10-CM

## 2024-11-26 DIAGNOSIS — M54.2 NECK PAIN: ICD-10-CM

## 2024-11-26 DIAGNOSIS — M99.03 SOMATIC DYSFUNCTION OF LUMBAR REGION: ICD-10-CM

## 2024-11-26 DIAGNOSIS — M99.02 SOMATIC DYSFUNCTION OF THORACIC REGION: ICD-10-CM

## 2024-11-26 PROCEDURE — 98941 CHIROPRACT MANJ 3-4 REGIONS: CPT | Performed by: CHIROPRACTOR

## 2024-11-26 ASSESSMENT — ENCOUNTER SYMPTOMS
DIFFICULTY URINATING: 0
DIARRHEA: 0
SHORTNESS OF BREATH: 0
DYSURIA: 0
VOMITING: 0
NAUSEA: 0
DIZZINESS: 0
AGITATION: 0
COLOR CHANGE: 0
FEVER: 0

## 2024-11-26 NOTE — PROGRESS NOTES
Assessment/Plan   The patient's R>L upper back/neck pain appears myofascial, with possibility of mild radiculopathy of lower CS. There is some limitation of cervical spine range of motion however there are no red flags. Treatment will involve soft tissue manipulation, spinal manipulation, and we reviewed stretches to do at home including active rotation and lateral flexion to improve his cervical spine mobility. Could consider CS radiographs pending trial of care. Added dry needling to care 5/9/24 with good result.    Visits this year: 6    Subjective   Patient reports that he got relief for about 1-2 days after our last visit with gradual return of symptoms. Today, he is feeling mild to moderate localized right-sided neck and upper trapezius pain. He bought a TENS unit, which provides pain relief for a few hours at a time.     HPI - 4/23/24 R upper back / neck pain -patient reports 1 week history of intermittent right-sided cervical thoracic pain and tightness focused in the upper trapezius region.  Pain ranges from 1-7 out of 10 intensity and is frequent.  Notes limited neck mobility.  Notes symptoms began after pushing a pile of wood.  Has had episodes of neck pain and upper back pain and tightness over the past few years that come and go typically self resolving.  He had symptoms on the left side like this in 20 20-20 21 that were alleviated after coming to our office and getting chiropractic treatment.  No radiating pain numbness or tingling further distal in the upper extremity    Review of Systems   Constitutional:  Negative for fever.   Eyes:  Negative for visual disturbance.   Respiratory:  Negative for shortness of breath.    Cardiovascular:  Negative for chest pain.   Gastrointestinal:  Negative for diarrhea, nausea and vomiting.   Genitourinary:  Negative for difficulty urinating and dysuria.   Skin:  Negative for color change.   Neurological:  Negative for dizziness.   Psychiatric/Behavioral:  Negative  for agitation.    All other systems reviewed and are negative.    Objective   Examination findings (e.g., palpation & ROM): Dec CS ROM BL with increased pain/tightness in lower neck  HTN/tender upper trapezius & CS erectors (mild R>L)    Segmental joint dysfunction was identified in the following areas using motion palpation and/or pain provocation assessment:  Cervical: 7  Thoracic: 1-3, 4-5  Lumbopelvic:  1-2    Physical Exam  Neurological:      Mental Status: He is alert.      Sensory: Sensation is intact.      Motor: Motor function is intact.      Deep Tendon Reflexes:      Reflex Scores:       Tricep reflexes are 2+ on the right side and 2+ on the left side.       Bicep reflexes are 2+ on the right side and 2+ on the left side.       Brachioradialis reflexes are 2+ on the right side and 2+ on the left side.     Comments: UE str WNL  11/14/24       Plan   Today's treatment:  STM to patient tolerance to hypertonic paraspinal muscles  SMT to regions of segmental dysfunction identified on exam, using age-appropriate force, and manual diversified technique. Low force on CS  Dry needling (10 in, 10 out) to region of the chief complaint / hypertonic muscles identified upon palpation in R upper trapezius and CS erectors  Manual dynamic stretching of the upper trapezius BL 5m  Patient noted improved mobility post-treatment    Treatment Plan:   The patient and I discussed the risks and benefits of chiropractic care. Based on the patient's subjective complaints along with the examination findings, it is advised that a course of chiropractic treatment be initiated. The patient provided consent for care. The patient tolerated today's treatment with little or no additional discomfort and was instructed to contact the office for questions or concerns. Will see patient once per week then every 2 weeks when symptoms become mild/manageable, further spaced apart contingent upon improvement.     This chart note was generated using  dictation software, and as such, there may be typographical errors present. Abbreviations: Cervical spine (CS), cervical-thoracic (CT), Dry needling (DN), Flexion adduction internal rotation (FAIR), high velocity, low amplitude (HVLA), Lumbar spine (LS), Soft tissue manipulation (STM), spinal manipulative therapy (SMT), Straight leg raise (SLR), Thoracic spine (TS).

## 2024-12-04 DIAGNOSIS — I10 BENIGN ESSENTIAL HYPERTENSION: ICD-10-CM

## 2024-12-05 RX ORDER — LISINOPRIL 20 MG/1
20 TABLET ORAL DAILY
Qty: 90 TABLET | Refills: 3 | Status: SHIPPED | OUTPATIENT
Start: 2024-12-05

## 2024-12-10 ENCOUNTER — APPOINTMENT (OUTPATIENT)
Dept: INTEGRATIVE MEDICINE | Facility: CLINIC | Age: 69
End: 2024-12-10
Payer: MEDICARE

## 2025-01-08 DIAGNOSIS — E78.5 HYPERLIPIDEMIA, UNSPECIFIED HYPERLIPIDEMIA TYPE: ICD-10-CM

## 2025-01-09 RX ORDER — ATORVASTATIN CALCIUM 40 MG/1
40 TABLET, FILM COATED ORAL NIGHTLY
Qty: 90 TABLET | Refills: 3 | Status: SHIPPED | OUTPATIENT
Start: 2025-01-09

## 2025-02-10 ENCOUNTER — APPOINTMENT (OUTPATIENT)
Dept: SLEEP MEDICINE | Facility: CLINIC | Age: 70
End: 2025-02-10
Payer: MEDICARE

## 2025-03-18 ENCOUNTER — APPOINTMENT (OUTPATIENT)
Dept: PRIMARY CARE | Facility: CLINIC | Age: 70
End: 2025-03-18
Payer: MEDICARE

## 2025-03-18 VITALS
OXYGEN SATURATION: 95 % | HEART RATE: 61 BPM | DIASTOLIC BLOOD PRESSURE: 69 MMHG | RESPIRATION RATE: 16 BRPM | SYSTOLIC BLOOD PRESSURE: 148 MMHG | BODY MASS INDEX: 32.16 KG/M2 | WEIGHT: 250.6 LBS | HEIGHT: 74 IN

## 2025-03-18 DIAGNOSIS — M25.561 CHRONIC PAIN OF RIGHT KNEE: ICD-10-CM

## 2025-03-18 DIAGNOSIS — G89.29 CHRONIC PAIN OF RIGHT KNEE: ICD-10-CM

## 2025-03-18 DIAGNOSIS — E78.5 DYSLIPIDEMIA: ICD-10-CM

## 2025-03-18 DIAGNOSIS — B96.89 ACUTE BACTERIAL SINUSITIS: ICD-10-CM

## 2025-03-18 DIAGNOSIS — Z71.89 GOALS OF CARE, COUNSELING/DISCUSSION: ICD-10-CM

## 2025-03-18 DIAGNOSIS — I10 BENIGN ESSENTIAL HYPERTENSION: ICD-10-CM

## 2025-03-18 DIAGNOSIS — J01.90 ACUTE BACTERIAL SINUSITIS: ICD-10-CM

## 2025-03-18 DIAGNOSIS — Z00.00 MEDICARE ANNUAL WELLNESS VISIT, SUBSEQUENT: Primary | ICD-10-CM

## 2025-03-18 DIAGNOSIS — R35.1 NOCTURIA: ICD-10-CM

## 2025-03-18 PROCEDURE — G0439 PPPS, SUBSEQ VISIT: HCPCS | Performed by: INTERNAL MEDICINE

## 2025-03-18 PROCEDURE — 1123F ACP DISCUSS/DSCN MKR DOCD: CPT | Performed by: INTERNAL MEDICINE

## 2025-03-18 PROCEDURE — 1158F ADVNC CARE PLAN TLK DOCD: CPT | Performed by: INTERNAL MEDICINE

## 2025-03-18 PROCEDURE — 1159F MED LIST DOCD IN RCRD: CPT | Performed by: INTERNAL MEDICINE

## 2025-03-18 PROCEDURE — 1036F TOBACCO NON-USER: CPT | Performed by: INTERNAL MEDICINE

## 2025-03-18 PROCEDURE — 3008F BODY MASS INDEX DOCD: CPT | Performed by: INTERNAL MEDICINE

## 2025-03-18 PROCEDURE — 1170F FXNL STATUS ASSESSED: CPT | Performed by: INTERNAL MEDICINE

## 2025-03-18 PROCEDURE — 3078F DIAST BP <80 MM HG: CPT | Performed by: INTERNAL MEDICINE

## 2025-03-18 PROCEDURE — 1157F ADVNC CARE PLAN IN RCRD: CPT | Performed by: INTERNAL MEDICINE

## 2025-03-18 PROCEDURE — 3077F SYST BP >= 140 MM HG: CPT | Performed by: INTERNAL MEDICINE

## 2025-03-18 PROCEDURE — 99214 OFFICE O/P EST MOD 30 MIN: CPT | Performed by: INTERNAL MEDICINE

## 2025-03-18 RX ORDER — AMOXICILLIN AND CLAVULANATE POTASSIUM 875; 125 MG/1; MG/1
875 TABLET, FILM COATED ORAL 2 TIMES DAILY
Qty: 10 TABLET | Refills: 0 | Status: SHIPPED | OUTPATIENT
Start: 2025-03-18 | End: 2025-03-23

## 2025-03-18 ASSESSMENT — ACTIVITIES OF DAILY LIVING (ADL)
TAKING_MEDICATION: INDEPENDENT
BATHING: INDEPENDENT
DRESSING: INDEPENDENT
GROCERY_SHOPPING: INDEPENDENT
DOING_HOUSEWORK: INDEPENDENT
MANAGING_FINANCES: INDEPENDENT

## 2025-03-18 ASSESSMENT — ENCOUNTER SYMPTOMS
CONSTIPATION: 0
BLOOD IN STOOL: 0
COUGH: 1
DIZZINESS: 0
FATIGUE: 0
LOSS OF SENSATION IN FEET: 0
SLEEP DISTURBANCE: 0
DEPRESSION: 0
HEADACHES: 0
OCCASIONAL FEELINGS OF UNSTEADINESS: 1
SHORTNESS OF BREATH: 0

## 2025-03-18 NOTE — ASSESSMENT & PLAN NOTE
-BP mildly elevated today in setting of pt being sick. Normally lower at home. Will continue lisinopril.  Orders:    Comprehensive metabolic panel; Future    CBC; Future

## 2025-03-18 NOTE — PROGRESS NOTES
Subjective   Reason for Visit: Anthony Valdez is an 70 y.o. male here for a Medicare Wellness visit.     Past Medical, Surgical, and Family History reviewed and updated in chart.    Reviewed all medications by prescribing practitioner or clinical pharmacist (such as prescriptions, OTCs, herbal therapies and supplements) and documented in the medical record.    Pt here for MAW.    Tries to go for a walk most nights for around a mile. 3 days a week will also use his rowing machine.    Sleeps 7-8 hours a night. Tolerates CPAP without issue.      Has had a cold for the last week (last day he was in the [place] Had congestion, malaise, cough productive w/ green phlegm. L ear is plugged up which is the most frustrating symptom for him. Tried to pinch his nose and blow which hasn't helped. Symptoms have stayed the same these last couple days. Is taking OTC medications. Believes there is a decongestant, cough suppressant, and something to help with loosening secretions.    Over the last few months has had pain along the inside of his R knee usually at night. Has woken up from this pain. Has worn a knee brace the last month, which hasn't helped. Sleeps on his side.            Patient Care Team:  Asia Dasilva MD as PCP - General (Internal Medicine)  Senthil Bishop MD as Surgeon (Sleep Medicine)  Leland Shoemaker DC as Consulting Physician (Chiropractic Medicine)     Pt is not considered to be at high risk of opioid abuse after reviewing chart.    Review of Systems   Constitutional:  Negative for fatigue.   HENT:  Positive for congestion.    Respiratory:  Positive for cough. Negative for shortness of breath.    Cardiovascular:  Negative for chest pain.   Gastrointestinal:  Negative for blood in stool and constipation.   Neurological:  Negative for dizziness and headaches.   Psychiatric/Behavioral:  Negative for sleep disturbance.        Objective   Vitals:  /69 (BP Location: Right arm, Patient Position: Sitting)   " Pulse 61   Resp 16   Ht 1.867 m (6' 1.5\")   Wt 114 kg (250 lb 9.6 oz)   SpO2 95%   BMI 32.61 kg/m²       Physical Exam  Constitutional:       General: He is not in acute distress.     Appearance: He is not ill-appearing, toxic-appearing or diaphoretic.   HENT:      Head: Normocephalic and atraumatic.      Right Ear: Tympanic membrane, ear canal and external ear normal.      Left Ear: Ear canal and external ear normal. Tympanic membrane is erythematous and bulging.      Mouth/Throat:      Mouth: Mucous membranes are moist.      Pharynx: Oropharynx is clear. No oropharyngeal exudate or posterior oropharyngeal erythema.   Eyes:      Conjunctiva/sclera: Conjunctivae normal.   Cardiovascular:      Rate and Rhythm: Normal rate and regular rhythm.      Heart sounds: No murmur heard.     No friction rub. No gallop.   Pulmonary:      Effort: Pulmonary effort is normal. No respiratory distress.      Breath sounds: No stridor. No wheezing, rhonchi or rales.   Neurological:      Mental Status: He is alert.         Assessment/Plan   Assessment & Plan  Medicare annual wellness visit, subsequent         Goals of care, counseling/discussion         Dyslipidemia  -Controlled on statin. Will recheck lvls.  Orders:    Lipid panel; Future    Nocturia  -Gets up at least once a month.  Orders:    PSA; Future    Benign essential hypertension  -BP mildly elevated today in setting of pt being sick. Normally lower at home. Will continue lisinopril.  Orders:    Comprehensive metabolic panel; Future    CBC; Future    Acute bacterial sinusitis  -Has been sick for a week now. Symptoms concerning for sinusitis. Given lack of improvement will treat w/ abx, although its unclear if lack of improvement is from using decongestants for too many days in a row. Will treat w/ shorter course of augmentin. Side effects reviewed. Recommended pt stop decongestant and use flonase instead to help with symptom relief. Pt agreeable.  Orders:    " amoxicillin-pot clavulanate (Augmentin) 875-125 mg tablet; Take 1 tablet (875 mg) by mouth 2 times a day for 5 days.    Chronic pain of right knee  -Only present at night. Will wake pt up from his sleep. Pt sleeps on his side. Advised to sleep with pillow between his legs and to stretch before bedtime to see if this helps.        -Labwork as above.  -Clarified colonoscopy need to be done 5 years after last one done in 2022.  -Pt to return in 6 months to follow up regarding weight loss.    Advance Directives Discussion  Advanced Care Planning (including a Living Will, Healthcare POA, as well as specific end of life choices and/or directives), was discussed with the patient and/or surrogate, voluntarily, and details of that discussion documented in the Problem List (under Advanced Directives Discussion) of the medical record.  Pt confirms he has a living will and HCPOA. Contacts similar as before. Confirms he still is full code.   (~16 min spent discussing above)

## 2025-03-19 LAB
ALBUMIN SERPL-MCNC: 4.8 G/DL (ref 3.6–5.1)
ALP SERPL-CCNC: 48 U/L (ref 35–144)
ALT SERPL-CCNC: 21 U/L (ref 9–46)
ANION GAP SERPL CALCULATED.4IONS-SCNC: 8 MMOL/L (CALC) (ref 7–17)
AST SERPL-CCNC: 18 U/L (ref 10–35)
BILIRUB SERPL-MCNC: 0.8 MG/DL (ref 0.2–1.2)
BUN SERPL-MCNC: 18 MG/DL (ref 7–25)
CALCIUM SERPL-MCNC: 9.3 MG/DL (ref 8.6–10.3)
CHLORIDE SERPL-SCNC: 105 MMOL/L (ref 98–110)
CHOLEST SERPL-MCNC: 170 MG/DL
CHOLEST/HDLC SERPL: 4.4 (CALC)
CO2 SERPL-SCNC: 29 MMOL/L (ref 20–32)
CREAT SERPL-MCNC: 1.12 MG/DL (ref 0.7–1.35)
EGFRCR SERPLBLD CKD-EPI 2021: 71 ML/MIN/1.73M2
ERYTHROCYTE [DISTWIDTH] IN BLOOD BY AUTOMATED COUNT: 12.3 % (ref 11–15)
GLUCOSE SERPL-MCNC: 96 MG/DL (ref 65–99)
HCT VFR BLD AUTO: 42 % (ref 38.5–50)
HDLC SERPL-MCNC: 39 MG/DL
HGB BLD-MCNC: 13.9 G/DL (ref 13.2–17.1)
LDLC SERPL CALC-MCNC: 99 MG/DL (CALC)
MCH RBC QN AUTO: 31 PG (ref 27–33)
MCHC RBC AUTO-ENTMCNC: 33.1 G/DL (ref 32–36)
MCV RBC AUTO: 93.5 FL (ref 80–100)
NONHDLC SERPL-MCNC: 131 MG/DL (CALC)
PLATELET # BLD AUTO: 250 THOUSAND/UL (ref 140–400)
PMV BLD REES-ECKER: 9.9 FL (ref 7.5–12.5)
POTASSIUM SERPL-SCNC: 4.4 MMOL/L (ref 3.5–5.3)
PROT SERPL-MCNC: 7.4 G/DL (ref 6.1–8.1)
PSA SERPL-MCNC: 0.77 NG/ML
RBC # BLD AUTO: 4.49 MILLION/UL (ref 4.2–5.8)
SODIUM SERPL-SCNC: 142 MMOL/L (ref 135–146)
TRIGL SERPL-MCNC: 207 MG/DL
WBC # BLD AUTO: 7.1 THOUSAND/UL (ref 3.8–10.8)

## 2025-03-26 DIAGNOSIS — H61.21 IMPACTED CERUMEN OF RIGHT EAR: Primary | ICD-10-CM

## 2025-04-02 ENCOUNTER — APPOINTMENT (OUTPATIENT)
Dept: OTOLARYNGOLOGY | Facility: CLINIC | Age: 70
End: 2025-04-02
Payer: MEDICARE

## 2025-04-02 VITALS — WEIGHT: 250 LBS | HEIGHT: 74 IN | BODY MASS INDEX: 32.08 KG/M2

## 2025-04-02 DIAGNOSIS — H69.92 DYSFUNCTION OF LEFT EUSTACHIAN TUBE: Primary | ICD-10-CM

## 2025-04-02 DIAGNOSIS — H61.21 IMPACTED CERUMEN OF RIGHT EAR: ICD-10-CM

## 2025-04-02 PROCEDURE — 1123F ACP DISCUSS/DSCN MKR DOCD: CPT | Performed by: PHYSICIAN ASSISTANT

## 2025-04-02 PROCEDURE — 1157F ADVNC CARE PLAN IN RCRD: CPT | Performed by: PHYSICIAN ASSISTANT

## 2025-04-02 PROCEDURE — 1159F MED LIST DOCD IN RCRD: CPT | Performed by: PHYSICIAN ASSISTANT

## 2025-04-02 PROCEDURE — 99203 OFFICE O/P NEW LOW 30 MIN: CPT | Performed by: PHYSICIAN ASSISTANT

## 2025-04-02 PROCEDURE — 3008F BODY MASS INDEX DOCD: CPT | Performed by: PHYSICIAN ASSISTANT

## 2025-04-02 PROCEDURE — 1036F TOBACCO NON-USER: CPT | Performed by: PHYSICIAN ASSISTANT

## 2025-04-02 RX ORDER — PREDNISONE 20 MG/1
TABLET ORAL
Qty: 9 TABLET | Refills: 0 | Status: SHIPPED | OUTPATIENT
Start: 2025-04-02

## 2025-04-02 NOTE — PROGRESS NOTES
Anthony Valdez is a 70 y.o. year old male patient with Cerumen Impaction     Patient presents to the office today for assessment of his ears.  The patient states that 3 weeks ago he had upper respiratory illness with cough and nasal congestion.  He states that he then traveled on an airplane 2 days later and developed left ear congestion along with his upper respiratory symptoms.  He was treated with an oral antibiotic for 5 days as well as antihistamine/decongestion and nasal steroid spray.  Antihistamine/decongestion and nasal steroid spray.  He is reporting some popping cracking phenomenon in the left ear but states that the ear still feeling blocked.  He does have hearing aids which he purchased in September at an outside audiologic clinic.  He did have repeat audiogram completed at outside facility on March 31.  The patient does have the audiogram with him today and comparison study that was completed in September 2024.  The patient's right ear is noted for a mild sloping to moderately severe sensorineural hearing loss.  The left ear is noted for a mild sloping to severe mixed hearing loss.  Patient had normal tympanometry bilaterally but borderline left.  Word discrimination was 100% left 90% right.  He reports that upper respiratory symptoms have improved.      Review of Systems   All other systems reviewed and are negative.        Physical Exam:   General appearance: No acute distress. Normal facies. Symmetric facial movement. No gross lesions of the face are noted.    The external ear structures appear normal. The ear canals patent and the tympanic membranes are intact without evidence of air-fluid levels, retraction, or congenital defects.  Anterior rhinoscopy notes essentially a midline nasal septum. Examination is noted for normal healthy mucosal membranes without any evidence of lesions, polyps, or exudate. The tongue is normally mobile. There are no lesions on the gingiva, buccal, or oral mucosa.  There are no oral cavity masses.  The neck is negative for mass lymphadenopathy. The trachea and parotid are clear. The thyroid bed is grossly unremarkable. The salivary gland structures are grossly unremarkable.    Assessment/Plan     1.  Eustachian tube dysfunction  2.  Sensorineural hearing loss right ear  3.  Mixed hearing loss left    Patient was seen today for assessment of ears with concern over left ear hearing.  Patient with suspected eustachian tube dysfunction with slight mixed hearing loss noted in the left ear.  Patient at this time to be started on 7-day taper of oral prednisone as well as double dose nasal steroids for 2 weeks before resuming 1 time daily.  I will see him back in 1 month for repeat exam as well as audiogram.  Certainly I will see him sooner should a problem arise.

## 2025-05-05 ENCOUNTER — APPOINTMENT (OUTPATIENT)
Dept: AUDIOLOGY | Facility: CLINIC | Age: 70
End: 2025-05-05
Payer: MEDICARE

## 2025-05-05 ENCOUNTER — APPOINTMENT (OUTPATIENT)
Dept: OTOLARYNGOLOGY | Facility: CLINIC | Age: 70
End: 2025-05-05
Payer: MEDICARE

## 2025-05-06 ENCOUNTER — APPOINTMENT (OUTPATIENT)
Dept: OTOLARYNGOLOGY | Facility: CLINIC | Age: 70
End: 2025-05-06
Payer: MEDICARE

## 2025-05-06 VITALS — HEIGHT: 74 IN | WEIGHT: 250 LBS | BODY MASS INDEX: 32.08 KG/M2

## 2025-05-06 DIAGNOSIS — H90.A32 MIXED CONDUCTIVE AND SENSORINEURAL HEARING LOSS OF LEFT EAR WITH RESTRICTED HEARING OF RIGHT EAR: ICD-10-CM

## 2025-05-06 DIAGNOSIS — J34.3 HYPERTROPHY OF BOTH INFERIOR NASAL TURBINATES: ICD-10-CM

## 2025-05-06 DIAGNOSIS — J34.2 NASAL SEPTAL DEVIATION: ICD-10-CM

## 2025-05-06 DIAGNOSIS — H69.92 DYSFUNCTION OF LEFT EUSTACHIAN TUBE: ICD-10-CM

## 2025-05-06 DIAGNOSIS — H65.192 ACUTE MEE (MIDDLE EAR EFFUSION), LEFT: Primary | ICD-10-CM

## 2025-05-06 PROCEDURE — 1159F MED LIST DOCD IN RCRD: CPT | Performed by: STUDENT IN AN ORGANIZED HEALTH CARE EDUCATION/TRAINING PROGRAM

## 2025-05-06 PROCEDURE — 69433 CREATE EARDRUM OPENING: CPT | Performed by: STUDENT IN AN ORGANIZED HEALTH CARE EDUCATION/TRAINING PROGRAM

## 2025-05-06 PROCEDURE — 99214 OFFICE O/P EST MOD 30 MIN: CPT | Performed by: STUDENT IN AN ORGANIZED HEALTH CARE EDUCATION/TRAINING PROGRAM

## 2025-05-06 PROCEDURE — 1036F TOBACCO NON-USER: CPT | Performed by: STUDENT IN AN ORGANIZED HEALTH CARE EDUCATION/TRAINING PROGRAM

## 2025-05-06 PROCEDURE — 3008F BODY MASS INDEX DOCD: CPT | Performed by: STUDENT IN AN ORGANIZED HEALTH CARE EDUCATION/TRAINING PROGRAM

## 2025-05-06 PROCEDURE — 31231 NASAL ENDOSCOPY DX: CPT | Performed by: STUDENT IN AN ORGANIZED HEALTH CARE EDUCATION/TRAINING PROGRAM

## 2025-05-06 RX ORDER — OFLOXACIN 3 MG/ML
5 SOLUTION AURICULAR (OTIC) 2 TIMES DAILY
Qty: 0.25 ML | Refills: 0 | Status: SHIPPED | OUTPATIENT
Start: 2025-05-06 | End: 2025-05-11

## 2025-05-06 NOTE — PROGRESS NOTES
"HPI  Anthony Valdez \"Mike\" is a 70 y.o. male presenting for initial evaluation of left middle ear effusion.  The patient reports developing left aural fullness and fluid within his left ear following a URI and a flight 2 months ago.  He had a audiogram at an outside audiology clinic notable for right sensorineural hearing loss and left mixed hearing loss.  Received systemic steroids in addition to Flonase without significant improvement of his symptoms.  Currently endorses persistent left aural fullness, decreased hearing, crackling/popping.  Denies otalgia, otorrhea, vertigo.  No history of prior ear surgeries.    Medical History[1]    Surgical History[2]      Medications Ordered Prior to Encounter[3]     RX Allergies[4]     Review of Systems  A detailed 12 point ROS was performed and is negative except as noted in the intake form, HPI and/or Past Medical History        Physical Exam   CONSTITUTIONAL: Well developed, well nourished.  VOICE: Normal voice quality  RESPIRATION: Breathing comfortably, no stridor.  CV: No clubbing/cyanosis/edema in hands.  EYES: EOM Intact, sclera normal.  NEURO: Alert and oriented times 3, Cranial nerves V,VII intact and symmetric bilaterally.  HEAD AND FACE: Symmetric facial features, no masses or lesions, sinuses nontender to palpation.  SALIVARY GLANDS: Parotid and submandibular glands normal bilaterally.  + EARS: Normal external ears  Right EAC patent, tympanic membrane intact  Left EAC patent, tympanic membrane intact, dull, retracted  NOSE: External nose midline, anterior rhinoscopy is normal with limited visualization to the anterior aspect of the interior turbinates. No lesions noted.  ORAL CAVITY/OROPHARYNX/LIPS: Normal mucous membranes, normal floor of mouth/tongue/OP, no masses or lesions are noted.  PHARYNGEAL WALLS AND NASOPHARYNX: No masses noted. Mucosa appears clean and moist  NECK/LYMPH: No LAD, no thyroid masses. Trachea palpably midline  SKIN: Neck skin is without " injury  PSYCH: Alert and oriented with appropriate mood and affect     Nasal endoscopy:  PROCEDURE NOTE    For better visualization because of septal deviation, turbinate hypertrophy nasal endoscopy was performed after verbal consent was obtained by the patient and/or guardian. Both nostrils were sprayed with a mixture of lidocaine 4% and Afrin. After a sufficient amount of time elapsed for mucosal anesthesia to take place, the nasal endoscope was advanced into the nostril.    The following areas were visualized:  Nasal passage, nasal septum, turbinates, middle meatus, nasopharynx, sinus ostia    The patient tolerated the procedure well and these structures were found to be normal except as follows:  Bilateral inferior turbinate hypertrophy  Right septal deviation  No nasopharyngeal lesions/masses or obstructive adenoid pads  No mucopurulence, polyps, nor masses seen in inferior meati, middle meati, nor sphenoethmoidal recesses bilaterally.     Procedure: Left myringotomy and ear tube placement  Multiple treatment alternatives, risks and benefits discussed, he will like to proceed with left myringotomy and ear to placement.  Benefits were discussed and include possibility of decreased infections, better hearing, and healthier eardrums. Risks were discussed including recurrent otorrhea, tube blockage or extrusion requiring early replacement, perforation of the tympanic membrane requiring tympanoplasty, possible need for tube removal and myringoplasty and possible need for future tube placement. A full history and physical examination, informed consent and preoperative teaching, planning and arrangements have been performed  Procedure: Left myringotomy and ear tube placement  The patient was taken to the procedure room.  The procedure was reviewed and explained, consent was obtained. With the use of the microscope and a speculum, the left ear was examined.  Cerumen was cleaned.  A radial incision was made   in the  anterior-inferior quadrant and clear ME effusion was evacuated. A bobbin PE tube was placed followed by Floxin drops.  The patient tolerated the procedure well.      Assessment  Left mixed hearing loss  Left middle ear effusion  Left eustachian tube dysfunction  Right sensorineural hearing loss      Plan  70-year-old male presenting for evaluation of left aural fullness and decreased hearing following a URI, audiogram notable for left mixed hearing loss and right sensorineural hearing loss.  Received systemic steroids in addition to Flonase without improvement of his symptoms.  Exam notable for left middle ear effusion.  Multiple treatment alternatives, risks and benefits discussed, he elected to proceed with a left myringotomy and ear tube placement which was performed today in clinic.  Floxin course prescribed.  RTC 2m         [1]   Past Medical History:  Diagnosis Date    Allergic Fall 2018    Allergic rhinitis years ago    Chronic rhinitis 12/04/2013    Rhinitis    HL (hearing loss)     Hypertension     Personal history of other diseases of the digestive system     History of gastritis    Personal history of other diseases of the digestive system     History of hemorrhoids    Personal history of other diseases of the respiratory system     History of bronchitis    Sleep apnea 2013    Tinnitus 2010    Unspecified fracture of lower end of unspecified humerus, initial encounter for closed fracture     Elbow fracture   [2]   Past Surgical History:  Procedure Laterality Date    CIRCUMCISION, PRIMARY  1956    COSMETIC SURGERY      OTHER SURGICAL HISTORY  03/02/2015    Acellular Dermal Allograft Eyelids    ROTATOR CUFF REPAIR Right     VASECTOMY  1999    Surgery Vas Deferens Vasectomy   [3]   Current Outpatient Medications on File Prior to Visit   Medication Sig Dispense Refill    aspirin 81 mg EC tablet Take by mouth.      atorvastatin (Lipitor) 40 mg tablet TAKE 1 TABLET BY MOUTH ONCE  DAILY AT BEDTIME 90 tablet 3     cholecalciferol (Vitamin D-3) 50 mcg (2,000 unit) capsule Take 2 capsules (100 mcg) by mouth early in the morning..      coenzyme Q-10 200 mg capsule Take 1 capsule (200 mg) by mouth once daily.      ketoconazole (Nizoral) 2 % shampoo Use twice weekly      lisinopril 20 mg tablet TAKE 1 TABLET BY MOUTH ONCE  DAILY 90 tablet 3    minoxidiL (Rogaine) 5 % foam USE AS DIRECTED ON PACKAGE      multivitamin tablet Take 1 tablet by mouth once daily.      tadalafil (Cialis) 5 mg tablet Take 1 tablet (5 mg) by mouth once daily as needed for erectile dysfunction. 12 tablet 3    predniSONE (Deltasone) 20 mg tablet 2 tabs daily x3 days then 1 tablet daily for 2 days then 1/2 tablet for 2 days (Patient not taking: Reported on 5/6/2025) 9 tablet 0     No current facility-administered medications on file prior to visit.   [4] No Known Allergies

## 2025-05-07 ENCOUNTER — OFFICE VISIT (OUTPATIENT)
Dept: PRIMARY CARE | Facility: CLINIC | Age: 70
End: 2025-05-07
Payer: MEDICARE

## 2025-05-07 VITALS
SYSTOLIC BLOOD PRESSURE: 137 MMHG | DIASTOLIC BLOOD PRESSURE: 72 MMHG | WEIGHT: 251.2 LBS | RESPIRATION RATE: 18 BRPM | HEART RATE: 59 BPM | BODY MASS INDEX: 32.69 KG/M2 | OXYGEN SATURATION: 95 %

## 2025-05-07 DIAGNOSIS — S63.641A SPRAIN OF METACARPOPHALANGEAL (MCP) JOINT OF RIGHT THUMB, INITIAL ENCOUNTER: Primary | ICD-10-CM

## 2025-05-07 PROCEDURE — 3075F SYST BP GE 130 - 139MM HG: CPT | Performed by: INTERNAL MEDICINE

## 2025-05-07 PROCEDURE — 3078F DIAST BP <80 MM HG: CPT | Performed by: INTERNAL MEDICINE

## 2025-05-07 PROCEDURE — 1159F MED LIST DOCD IN RCRD: CPT | Performed by: INTERNAL MEDICINE

## 2025-05-07 PROCEDURE — 99212 OFFICE O/P EST SF 10 MIN: CPT | Performed by: INTERNAL MEDICINE

## 2025-05-07 ASSESSMENT — ENCOUNTER SYMPTOMS: ARTHRALGIAS: 1

## 2025-05-07 NOTE — PROGRESS NOTES
"Subjective   Patient ID: Anthony Valdez \"Mike\" is a 70 y.o. male who presents for Wrist Pain (Mostly on the bone on R wrist. Even painful to palpation. Started 5-6 weeks ago after doing yard work. Hx of pain in that area but usually diffuses after day or two. Wears wrist brace but hasn't been helping. When extended laterally, has shooting pain up arm. ).    Pt presents for R thumb pain that started 5-6 weeks ago. Began after doing yard work. Gets pain when he pushes his hand laterally, and this will radiate up the forearm. Will be TTP afterwards but gets better quickly on its own. Has been wearing a brace for 3 weeks which has stabilized the pain (hasn't gotten better or worse during this time). Has taken ibuprofen on the worst days which helps. Has dealt with this pain in the past but it usually resolves within 1-2 days on its own.        Review of Systems   Musculoskeletal:  Positive for arthralgias.       /72 (BP Location: Right arm, Patient Position: Sitting)   Pulse 59   Resp 18   Wt 114 kg (251 lb 3.2 oz)   SpO2 95%   BMI 32.69 kg/m²   Objective   Physical Exam  Constitutional:       General: He is not in acute distress.     Appearance: He is not ill-appearing, toxic-appearing or diaphoretic.   HENT:      Head: Normocephalic and atraumatic.   Eyes:      Conjunctiva/sclera: Conjunctivae normal.   Musculoskeletal:      Comments: Pain with lateral motion of thumb. No diminished weakness appreciated   Neurological:      Mental Status: He is alert.         Assessment/Plan   Problem List Items Addressed This Visit    None  Visit Diagnoses         Codes      Sprain of metacarpophalangeal (MCP) joint of right thumb, initial encounter    -  Primary S63.641A        -Advised pt keep using brace, ice, and take ibuprofen twice a day with meals for the next two weeks. Gave pamphlet with stretches for him to start to do this weekend. If no improvement after a month then pt may need to see sports medicine/ortho " downstairs. Pt to reach out if that is the case. He is agreeable with the plan. All questions answered.         Asia Dasilva MD 05/07/25 10:13 AM

## 2025-05-20 ENCOUNTER — APPOINTMENT (OUTPATIENT)
Dept: OTOLARYNGOLOGY | Facility: CLINIC | Age: 70
End: 2025-05-20
Payer: MEDICARE

## 2025-05-20 ENCOUNTER — CLINICAL SUPPORT (OUTPATIENT)
Dept: AUDIOLOGY | Facility: CLINIC | Age: 70
End: 2025-05-20
Payer: MEDICARE

## 2025-05-20 DIAGNOSIS — H93.A9 PULSATILE TINNITUS: Primary | ICD-10-CM

## 2025-05-20 DIAGNOSIS — H69.92 EUSTACHIAN TUBE DYSFUNCTION, LEFT: Primary | ICD-10-CM

## 2025-05-20 DIAGNOSIS — S63.641A SPRAIN OF METACARPOPHALANGEAL (MCP) JOINT OF RIGHT THUMB, INITIAL ENCOUNTER: Primary | ICD-10-CM

## 2025-05-20 PROCEDURE — 92567 TYMPANOMETRY: CPT | Performed by: AUDIOLOGIST

## 2025-05-20 PROCEDURE — 1159F MED LIST DOCD IN RCRD: CPT | Performed by: PHYSICIAN ASSISTANT

## 2025-05-20 PROCEDURE — 1036F TOBACCO NON-USER: CPT | Performed by: PHYSICIAN ASSISTANT

## 2025-05-20 PROCEDURE — 99212 OFFICE O/P EST SF 10 MIN: CPT | Performed by: PHYSICIAN ASSISTANT

## 2025-05-20 NOTE — PROGRESS NOTES
Anthony Valdez is a 70 y.o. year old male patient with concern for the left ear still feeling full clogged and blocked with pulsatile tinnitus left ear.  Patient states that the pulsatile tinnitus can be stopped with compression over the left lateral neck.   Despite having recent placement of PE tubes symptoms have remained grossly the same.  Per the note from Dr. Chaparro the patient did indeed have effusion present at time of placement of myringotomy tube.  He is without otorrhea.  He is here today for reassessment.  There have been no significant changes in past medical or past surgical histories except as mentioned.      Review of Systems   All other systems reviewed and are negative.        Physical Exam:   General appearance: No acute distress. Normal facies. Symmetric facial movement. No gross lesions of the face are noted.  The external ear structures appear normal.  Examination of the right ear is unremarkable. There is no evidence of middle ear effusion or abnormality of the external auditory canal, tympanic membrane, and external ear itself.  Left PE tube appears intact clean and dry with normal appearance of the middle ear space.  Anterior rhinoscopy notes essentially a midline nasal septum. Examination is noted for normal healthy mucosal membranes without any evidence of lesions, polyps, or exudate. The tongue is normally mobile. There are no lesions on the gingiva, buccal, or oral mucosa. There are no oral cavity masses.  The neck is negative for mass lymphadenopathy. The trachea and parotid are clear. The thyroid bed is grossly unremarkable. The salivary gland structures are grossly unremarkable.    Tympanometry was completed today noting normal tympanometry in the right ear with large volume left consistent with patent PE tube.      Assessment/Plan   1.  Pulsatile tinnitus      Patient presents to the office today for follow-up.  Patient still complaining of left ear fullness as well as pulsatile  tinnitus following placement of PE tube.  Tube appears patent.  The patient is able to stop the pulsation with compression over the left jugular vein.  At this time the patient is going to be set up for dedicated CT IAC without contrast for further assessment to rule out possible dehiscence of the sigmoid sinus or jugular bulb or possible transverse sinus stenosis.  I will contact patient following CT to review the results.

## 2025-05-20 NOTE — PROGRESS NOTES
"TYMPANOMETRY ONLY      Name:  Anthony Valdez \"Mike\"  :  1955  Age:  70 y.o.  Date of Evaluation:  25   Referring Provider:  Dagoberto Butler PA-C     History:  Mr. Valdez was seen today for an add on tympanogram due to patient report of left pulsatile tinnitus and left PE tube.    See audiometric evaluation at end of this report or scanned under media tab    OTOSCOPY:       Right Ear: Clear canal       Left Ear: Clear canal with PE tube visualized    226 Hz TYMPANOMETRY:       Right Ear: Type A: normal peak pressure, compliance, and ear canal volume, consistent with middle ear function within normal limits       Left Ear: Type B: Flat with large ear canal volume, consistent with patent PE tube    DISCUSSION:   Discussed results and recommendations with patient.  Questions were addressed and the patient was encouraged to contact our department should concerns arise.    RECOMMENDATIONS:  -Patient may return for full audiometric evaluation as indicated by referring provider  -Recommend patient continue follow up with ENT provider    Sneha Darden, CCC-A     Appt: 2:30 - 2:40 PM      "

## 2025-05-22 ENCOUNTER — OFFICE VISIT (OUTPATIENT)
Dept: ORTHOPEDIC SURGERY | Facility: CLINIC | Age: 70
End: 2025-05-22
Payer: MEDICARE

## 2025-05-22 ENCOUNTER — HOSPITAL ENCOUNTER (OUTPATIENT)
Dept: RADIOLOGY | Facility: CLINIC | Age: 70
Discharge: HOME | End: 2025-05-22
Payer: MEDICARE

## 2025-05-22 DIAGNOSIS — M79.641 RIGHT HAND PAIN: ICD-10-CM

## 2025-05-22 DIAGNOSIS — S63.641A SPRAIN OF METACARPOPHALANGEAL (MCP) JOINT OF RIGHT THUMB, INITIAL ENCOUNTER: ICD-10-CM

## 2025-05-22 DIAGNOSIS — M65.4 DE QUERVAIN'S TENOSYNOVITIS: Primary | ICD-10-CM

## 2025-05-22 PROCEDURE — 73130 X-RAY EXAM OF HAND: CPT | Mod: RT

## 2025-05-22 RX ORDER — METHYLPREDNISOLONE ACETATE 40 MG/ML
20 INJECTION, SUSPENSION INTRA-ARTICULAR; INTRALESIONAL; INTRAMUSCULAR; SOFT TISSUE
Status: COMPLETED | OUTPATIENT
Start: 2025-05-22 | End: 2025-05-22

## 2025-05-22 RX ORDER — LIDOCAINE HYDROCHLORIDE 20 MG/ML
2 INJECTION, SOLUTION INFILTRATION; PERINEURAL
Status: COMPLETED | OUTPATIENT
Start: 2025-05-22 | End: 2025-05-22

## 2025-05-22 RX ADMIN — LIDOCAINE HYDROCHLORIDE 2 ML: 20 INJECTION, SOLUTION INFILTRATION; PERINEURAL at 11:32

## 2025-05-22 RX ADMIN — METHYLPREDNISOLONE ACETATE 20 MG: 40 INJECTION, SUSPENSION INTRA-ARTICULAR; INTRALESIONAL; INTRAMUSCULAR; SOFT TISSUE at 11:32

## 2025-05-22 NOTE — PROGRESS NOTES
Chief Complaint   Chief Complaint   Patient presents with    Right Thumb - Pain         HPI:      nAthony Valdez is a pleasant 70 y.o. year-old male who is seen today for right thumb and wrist pain he was doing a project at home and this flared up his had a before and it spontaneously resolved he is right-hand dominant    Review of Systems see intake sheet which was reviewed and scanned into the chart    There were no vitals filed for this visit.    Medical History[1]  Problem List[2]    Medication Documentation Review Audit       Reviewed by Rosetta Wong MA (Medical Assistant) on 05/22/25 at 1103      Medication Order Taking? Sig Documenting Provider Last Dose Status   aspirin 81 mg EC tablet [id number]  Take by mouth. Historical MD Shelia  Active   atorvastatin (Lipitor) 40 mg tablet [id number]  TAKE 1 TABLET BY MOUTH ONCE  DAILY AT BEDTIME Asia Dasilva MD  Active   cholecalciferol (Vitamin D-3) 50 mcg (2,000 unit) capsule [id number] No Take 2 capsules (100 mcg) by mouth early in the morning.. Historical Provider, MD Taking Active   coenzyme Q-10 200 mg capsule [id number] No Take 1 capsule (200 mg) by mouth once daily. Historical Provider, MD Taking Active   fluticasone propionate (FLONASE ALLERGY RELIEF NASL) [id number]   Historical Provider, MD  Active   ketoconazole (Nizoral) 2 % shampoo [id number] No Use twice weekly Historical Provider, MD Taking Active   lisinopril 20 mg tablet [id number]  TAKE 1 TABLET BY MOUTH ONCE  DAILY Asia Dasilva MD  Active   loratadine (CLARITIN ORAL) [id number]   Historical Provider, MD  Active   minoxidiL (Rogaine) 5 % foam [id number] No USE AS DIRECTED ON PACKAGE Historical Provider, MD Taking Active   multivitamin tablet [id number] No Take 1 tablet by mouth once daily. Historical MD Shelia Taking Active   tadalafil (Cialis) 5 mg tablet [id number]  Take 1 tablet (5 mg) by mouth once daily as needed for erectile dysfunction. Asia Dasilva MD  Active                    RX  Allergies[3]    Social History     Socioeconomic History    Marital status:      Spouse name: Not on file    Number of children: 2    Years of education: Not on file    Highest education level: Not on file   Occupational History    Occupation: retired   Tobacco Use    Smoking status: Never     Passive exposure: Never    Smokeless tobacco: Never   Vaping Use    Vaping status: Never Used   Substance and Sexual Activity    Alcohol use: Yes     Alcohol/week: 9.0 standard drinks of alcohol     Types: 2 Glasses of wine, 7 Cans of beer per week     Comment: 1 beer or a glass of wine a night.    Drug use: Never    Sexual activity: Yes     Partners: Female     Birth control/protection: Male Sterilization   Other Topics Concern    Not on file   Social History Narrative    Not on file     Social Drivers of Health     Financial Resource Strain: Not on file   Food Insecurity: Not on file   Transportation Needs: Not on file   Physical Activity: Not on file   Stress: Not on file   Social Connections: Not on file   Intimate Partner Violence: Not on file   Housing Stability: Not on file       Surgical History[4]    There is no height or weight on file to calculate BMI.    HgA1c:   Lab Results   Component Value Date    HGBA1C 4.9 03/14/2024    BINKPUQR4M 94 03/14/2024       Physical Exam:  Constitutional: Well-developed well-nourished   Eyes: Sclerae anicteric, pupils equal and round  HENT: Normocephalic atraumatic  Cardiovascular: Pulses full, regular rate and rhythm  Respiratory: Breathing not labored, no wheezing  Integumentary: Skin intact, no lesions or rashes  Neurological: Sensation intact, no gross strength deficits, reflexes equal  Psychiatric: Alert oriented and appropriate  Hematologic/lymphatic: No lymphadenopathy  Right hand and wrist he is tender over the first dorsal compartment positive Finkelstein's nerve and tendon function otherwise intact          Imaging:  X-rays personally reviewed  negative        Impression/Plan:  De Quervain's tenosynovitis   Splint injection discussed possible surgical intervention  Hand / UE Inj/Asp: R extensor compartment 1 for de Quervain's tenosynovitis on 5/22/2025 11:32 AM  Indications: pain  Details: 25 G needle, radial approach  Medications: 20 mg methylPREDNISolone acetate 40 mg/mL; 2 mL lidocaine 20 mg/mL (2 %)               [1]   Past Medical History:  Diagnosis Date    Allergic Fall 2018    Allergic rhinitis years ago    Chronic rhinitis 12/04/2013    Rhinitis    HL (hearing loss)     Hypertension     Personal history of other diseases of the digestive system     History of gastritis    Personal history of other diseases of the digestive system     History of hemorrhoids    Personal history of other diseases of the respiratory system     History of bronchitis    Sleep apnea 2013    Tinnitus 2010    Unspecified fracture of lower end of unspecified humerus, initial encounter for closed fracture     Elbow fracture   [2]   Patient Active Problem List  Diagnosis    Acute bronchitis    Allergic rhinitis    Chronic rhinitis    Alopecia    Anesthesia of skin    Benign essential hypertension    Bradycardia by electrocardiogram    CAD (coronary artery disease)    Cervical myofascial pain syndrome    Chronic gastritis    Colon polyp    Cough, persistent    Cough    Snoring    COVID-19 virus infection    CRP elevated    Duodenal ulcer    PUD (peptic ulcer disease)    Dyspnea on exertion    Enlarged lymph node    External hemorrhoids    Internal hemorrhoids    Hematuria    Hiatal hernia    Hip pain, left    Hyperglycemia    Hypercholesterolemia    Dyslipidemia    Impacted cerumen of right ear    Intraventricular conduction delay    Lumbosacral dysfunction    Malaise and fatigue    Migraine without aura and without status migrainosus, not intractable    Myalgia    Neck pain    Obstructive sleep apnea of adult    Osteoarthritis, shoulder    Pain of toe of right foot     Positive occult stool blood test    Pulmonary hypertension, mild (Multi)    Pulmonary nodule    Segmental and somatic dysfunction    Shoulder impingement, right    Shoulder pain    Somnolence, daytime    ST segment abnormality    Strain of right rotator cuff capsule    Thoracic myofascial strain    Thoracic spine pain    Tinnitus    Vitamin D deficiency    WBC decreased    BMI 32.0-32.9,adult    Erectile dysfunction    Dysfunction of left eustachian tube   [3] No Known Allergies  [4]   Past Surgical History:  Procedure Laterality Date    CIRCUMCISION, PRIMARY  1956    COSMETIC SURGERY      OTHER SURGICAL HISTORY  03/02/2015    Acellular Dermal Allograft Eyelids    ROTATOR CUFF REPAIR Right     VASECTOMY  1999    Surgery Vas Deferens Vasectomy

## 2025-06-03 ENCOUNTER — HOSPITAL ENCOUNTER (OUTPATIENT)
Dept: RADIOLOGY | Facility: CLINIC | Age: 70
Discharge: HOME | End: 2025-06-03
Payer: MEDICARE

## 2025-06-03 DIAGNOSIS — H93.A9 PULSATILE TINNITUS: ICD-10-CM

## 2025-06-03 PROCEDURE — 70480 CT ORBIT/EAR/FOSSA W/O DYE: CPT

## 2025-06-03 PROCEDURE — 70480 CT ORBIT/EAR/FOSSA W/O DYE: CPT | Performed by: RADIOLOGY

## 2025-06-27 ENCOUNTER — APPOINTMENT (OUTPATIENT)
Dept: SLEEP MEDICINE | Facility: CLINIC | Age: 70
End: 2025-06-27
Payer: MEDICARE

## 2025-06-30 ENCOUNTER — APPOINTMENT (OUTPATIENT)
Dept: SLEEP MEDICINE | Facility: CLINIC | Age: 70
End: 2025-06-30
Payer: MEDICARE

## 2025-06-30 ENCOUNTER — TELEPHONE (OUTPATIENT)
Dept: SLEEP MEDICINE | Facility: HOSPITAL | Age: 70
End: 2025-06-30

## 2025-06-30 VITALS
SYSTOLIC BLOOD PRESSURE: 153 MMHG | HEART RATE: 60 BPM | TEMPERATURE: 97.7 F | BODY MASS INDEX: 31.85 KG/M2 | HEIGHT: 74 IN | DIASTOLIC BLOOD PRESSURE: 77 MMHG | WEIGHT: 248.2 LBS

## 2025-06-30 DIAGNOSIS — I27.20 PULMONARY HYPERTENSION, MILD (MULTI): ICD-10-CM

## 2025-06-30 DIAGNOSIS — G47.33 OSA (OBSTRUCTIVE SLEEP APNEA): Primary | ICD-10-CM

## 2025-06-30 DIAGNOSIS — I10 BENIGN ESSENTIAL HYPERTENSION: ICD-10-CM

## 2025-06-30 PROCEDURE — 1036F TOBACCO NON-USER: CPT | Performed by: STUDENT IN AN ORGANIZED HEALTH CARE EDUCATION/TRAINING PROGRAM

## 2025-06-30 PROCEDURE — 3008F BODY MASS INDEX DOCD: CPT | Performed by: STUDENT IN AN ORGANIZED HEALTH CARE EDUCATION/TRAINING PROGRAM

## 2025-06-30 PROCEDURE — 1160F RVW MEDS BY RX/DR IN RCRD: CPT | Performed by: STUDENT IN AN ORGANIZED HEALTH CARE EDUCATION/TRAINING PROGRAM

## 2025-06-30 PROCEDURE — 1159F MED LIST DOCD IN RCRD: CPT | Performed by: STUDENT IN AN ORGANIZED HEALTH CARE EDUCATION/TRAINING PROGRAM

## 2025-06-30 PROCEDURE — 3078F DIAST BP <80 MM HG: CPT | Performed by: STUDENT IN AN ORGANIZED HEALTH CARE EDUCATION/TRAINING PROGRAM

## 2025-06-30 PROCEDURE — 99214 OFFICE O/P EST MOD 30 MIN: CPT | Performed by: STUDENT IN AN ORGANIZED HEALTH CARE EDUCATION/TRAINING PROGRAM

## 2025-06-30 PROCEDURE — 3077F SYST BP >= 140 MM HG: CPT | Performed by: STUDENT IN AN ORGANIZED HEALTH CARE EDUCATION/TRAINING PROGRAM

## 2025-06-30 PROCEDURE — G2211 COMPLEX E/M VISIT ADD ON: HCPCS | Performed by: STUDENT IN AN ORGANIZED HEALTH CARE EDUCATION/TRAINING PROGRAM

## 2025-06-30 ASSESSMENT — SLEEP AND FATIGUE QUESTIONNAIRES
WORRIED_DISTRESSED_DUE_TO_SLEEP: NOT AT ALL NOTICEABLE
SATISFACTION_WITH_CURRENT_SLEEP_PATTERN: SATISFIED
HOW LIKELY ARE YOU TO NOD OFF OR FALL ASLEEP IN A CAR, WHILE STOPPED FOR A FEW MINUTES IN TRAFFIC: WOULD NEVER DOZE
SLEEP_PROBLEM_NOTICEABLE_TO_OTHERS: NOT AT ALL NOTICEABLE
HOW LIKELY ARE YOU TO NOD OFF OR FALL ASLEEP WHILE SITTING QUIETLY AFTER LUNCH WITHOUT ALCOHOL: SLIGHT CHANCE OF DOZING
HOW LIKELY ARE YOU TO NOD OFF OR FALL ASLEEP WHILE SITTING AND READING: SLIGHT CHANCE OF DOZING
HOW LIKELY ARE YOU TO NOD OFF OR FALL ASLEEP WHILE LYING DOWN TO REST IN THE AFTERNOON WHEN CIRCUMSTANCES PERMIT: WOULD NEVER DOZE
SITING INACTIVE IN A PUBLIC PLACE LIKE A CLASS ROOM OR A MOVIE THEATER: SLIGHT CHANCE OF DOZING
SLEEP_PROBLEM_INTERFERES_DAILY_ACTIVITIES: NOT AT ALL NOTICEABLE
HOW LIKELY ARE YOU TO NOD OFF OR FALL ASLEEP WHEN YOU ARE A PASSENGER IN A CAR FOR AN HOUR WITHOUT A BREAK: WOULD NEVER DOZE
HOW LIKELY ARE YOU TO NOD OFF OR FALL ASLEEP WHILE SITTING AND TALKING TO SOMEONE: WOULD NEVER DOZE
HOW LIKELY ARE YOU TO NOD OFF OR FALL ASLEEP WHILE WATCHING TV: SLIGHT CHANCE OF DOZING
ESS-CHAD TOTAL SCORE: 4

## 2025-06-30 NOTE — PROGRESS NOTES
" Patient: Anthony Valdez    [id number]  : 1955 -- AGE 70 y.o.    Provider: Senthil Bishop MD     Location [facility]   Service Date: 2025              [facility]  Followup Visit Note     Assessment/Plan   Mr. Valdez is a 70 y.o. male and he returns in followup to the [facility] for the problems listed below on 25   Problem List, Orders, Assessment, Recommendations:  Diagnoses and all orders for this visit:  BRENTON (obstructive sleep apnea)  -     Positive Airway Pressure (PAP) Therapy  -     Follow Up In Adult Sleep Medicine; Future  Benign essential hypertension    Assessment & Plan  Obstructive sleep apnea  Obstructive sleep apnea is managed with CPAP therapy. The current CPAP machine, acquired during COVID-19, is 2-3 years old and has a bright display issue disrupting sleep. He reports good adherence to CPAP use, with occasional removal in the early morning. He experiences difficulty during camping trips without CPAP but attributes poor sleep to the camping environment. He has a travel CPAP for vacations with electricity access.  - Suggest using light-dimming stickers for CPAP display to reduce brightness.  - Nurse to contact [employer] for CPAP usage reports.  - Discuss CPAP battery options for camping trips.    HTN  BP Readings from Last 1 Encounters:   25 153/77     - BP mildly elevated today but asymptomatic  - discussed at length the impact of untreated BRENTON and BP control  - continue current management and follow-up with PCP        Disposition  Return to clinic in 12 months          HISTORY OF PRESENT ILLNESS     HISTORY OF PRESENT ILLNESS   Anthony Valdez \"Mike\" is a 70 y.o. male with history of Hypertension and BRENTON presents to [facility] for followup.     Assessment and plan from last visit:   Mr. Valdez is a 69 y.o. male and He was referred to the [facility] " Sleep Medicine Clinic for evaluation of BRENTON     Problem List, Orders, Assessment, Recommendations:  Problem List Items Addressed This Visit               ICD-10-CM     Benign essential hypertension I10           BP Readings from Last 1 Encounters:   02/12/24 172/86   - doing well, elevated today (typically so in doc's office) but asymptomatic  - discussed at length the impact of untreated BRENTON and BP control  - continue current management and follow-up with PCP            Obstructive sleep apnea of adult - Primary G47.33       He is looking for new sleep provider.  BRENTON diagnosed 6 years ago (AHI ~ 40)  Now on 3B [place] with setting of 4-20 cwp  rAHI was 2, 95% pressure was 8.5 cwp  No issues  Renew supply via [employer]            Other Visit Diagnoses           Codes     BRENTON (obstructive sleep apnea)     G47.33     Relevant Orders     Positive Airway Pressure (PAP) Therapy        Current History    History of Present Illness  He is doing well with his CPAP therapy, although he finds the machine's display too bright, which disrupts his sleep when he gets up at night. He uses the CPAP machine regularly, putting it on when he goes to bed and occasionally removing it if he wakes up early.    He did not use the CPAP during a recent camping trip due to the lack of electricity, but he has a travel CPAP for vacations where electricity is available. He does not sleep well on camping trips due to the sleeping conditions, not necessarily the absence of the CPAP.    PAP Info  DME COMPANY: [employer]  Issues with PAP Therapy?: None  Perceived Benefits of PAP therapy: refreshing sleep    PAP Adherence  PAP Adherence : Not available to review in clinic today, will contact DME to retrieve report after the visit and communicate with patient if clinically indicated    Daytime Symptoms  Patient reports: no daytime symptoms  Patient denies: excessive daytime sleepiness  Napping habit: Patient denies taking naps.  Fatigue concerns: Patient denies  "feeling fatigue    ESS: 4  CELSO: 1  FOSQ: 40    PHYSICAL EXAM     VITAL SIGNS: /77 (BP Location: Right arm, Patient Position: Sitting, BP Cuff Size: Adult)   Pulse 60   Temp 36.5 °C (97.7 °F) (Oral)   Ht 1.88 m (6' 2\")   Wt 113 kg (248 lb 3.2 oz)   BMI 31.87 kg/m²      PREVIOUS WEIGHTS:  Wt Readings from Last 3 Encounters:   06/30/25 113 kg (248 lb 3.2 oz)   05/07/25 114 kg (251 lb 3.2 oz)   05/06/25 113 kg (250 lb)     This medical note was created with the assistance of artificial intelligence (AI) for documentation purposes. The content has been reviewed and confirmed by the healthcare provider for accuracy and completeness. Patient consented to the use of audio recording and use of AI during their visit.     "

## 2025-07-22 ENCOUNTER — APPOINTMENT (OUTPATIENT)
Dept: OTOLARYNGOLOGY | Facility: CLINIC | Age: 70
End: 2025-07-22
Payer: MEDICARE

## 2025-07-22 ENCOUNTER — APPOINTMENT (OUTPATIENT)
Dept: AUDIOLOGY | Facility: CLINIC | Age: 70
End: 2025-07-22
Payer: MEDICARE

## 2025-07-22 VITALS — BODY MASS INDEX: 31.83 KG/M2 | WEIGHT: 248 LBS | HEIGHT: 74 IN

## 2025-07-22 DIAGNOSIS — H69.92 DYSFUNCTION OF LEFT EUSTACHIAN TUBE: ICD-10-CM

## 2025-07-22 DIAGNOSIS — H90.3 SENSORINEURAL HEARING LOSS (SNHL) OF BOTH EARS: Primary | ICD-10-CM

## 2025-07-22 DIAGNOSIS — H90.3 ASYMMETRIC SNHL (SENSORINEURAL HEARING LOSS): ICD-10-CM

## 2025-07-22 DIAGNOSIS — H93.8X2 EAR PRESSURE, LEFT: ICD-10-CM

## 2025-07-22 DIAGNOSIS — H69.92 EUSTACHIAN TUBE DYSFUNCTION, LEFT: ICD-10-CM

## 2025-07-22 PROCEDURE — 92557 COMPREHENSIVE HEARING TEST: CPT | Performed by: AUDIOLOGIST

## 2025-07-22 PROCEDURE — 3008F BODY MASS INDEX DOCD: CPT | Performed by: STUDENT IN AN ORGANIZED HEALTH CARE EDUCATION/TRAINING PROGRAM

## 2025-07-22 PROCEDURE — 99214 OFFICE O/P EST MOD 30 MIN: CPT | Performed by: STUDENT IN AN ORGANIZED HEALTH CARE EDUCATION/TRAINING PROGRAM

## 2025-07-22 PROCEDURE — 1159F MED LIST DOCD IN RCRD: CPT | Performed by: STUDENT IN AN ORGANIZED HEALTH CARE EDUCATION/TRAINING PROGRAM

## 2025-07-22 PROCEDURE — 92567 TYMPANOMETRY: CPT | Performed by: AUDIOLOGIST

## 2025-07-22 PROCEDURE — 1036F TOBACCO NON-USER: CPT | Performed by: STUDENT IN AN ORGANIZED HEALTH CARE EDUCATION/TRAINING PROGRAM

## 2025-07-22 NOTE — PROGRESS NOTES
"COMPREHENSIVE AUDIOMETRIC EVALUATION      Name:  Anthony Valdez \"Mike\"  :  1955  Age:  70 y.o.  Date of Evaluation:  25   Referring Provider:   Cole Sltaer MD       History:  Mr. Valdez was seen today for an evaluation of hearing. Please recall, patient was seen for tympanometry only 2025 prior to PE tube placement by ENT.  Since that time, patient noted hearing remains muffled, but did improve.  Patient has a known hearing loss for which he utilizes hearing aids bilaterally from [employer].  When asked, patient denied otalgia    See audiometric evaluation at end of this report or scanned under media tab    OTOSCOPY:       Right Ear: Clear canal       Left Ear: Clear canal with PE tube visualized    226 Hz TYMPANOMETRY:       Right Ear: Type A: normal peak pressure, compliance, and ear canal volume, consistent with middle ear function within normal limits       Left Ear: Type B: Flat with large ear canal volume, consistent with patent PE tube    AUDIOMETRIC EVALUATION (Inserts, checked with phones):       Right Ear: Normal sloping to Moderately severe, Sensorineural hearing loss                 Left Ear: Mild through 4000 Hz sloping to Moderately severe, Sensorineural hearing loss           NOTE: Slight though non-clinically significant asymmetry from 6585-2891 Hz    Test technique:  Standard Audiometry  Reliability:   good    SPEECH RECOGNITION THRESHOLD:       Right Ear:  25 dBHL in good agreement with PTA       Left Ear:  25 dBHL in good agreement with PTA    WORD RECOGNITION:       Right Ear:  100%  excellent (%) at elevated presentation level       Left Ear:   100%  excellent (%) at elevated presentation level    DISCUSSION:   Discussed results and recommendations with patient.  Questions were addressed and the patient was encouraged to contact our department should concerns arise.    RECOMMENDATIONS:  -Recommend patient continue consistent utilization of hearing " aids and follow up with audiologist.  -Recommend patient return for repeated audiometric evaluation should concerns for changes in hearing sensitivity arise or as medically indicated.  -Recommend patient continue with scheduled ENT follow up with  MD Janet Salcido Au.D., CCC-A     Appt: 1:00 - 1:30 PM

## 2025-07-22 NOTE — PROGRESS NOTES
"HPI  7/22/25  The patient present for follow-up, reports resolution of his left aural fullness/pulsatile tinnitus following ear tube placement.  States his hearing is back to baseline.  Currently has no otologic complaints.  5/6/25  Anthony Valdez \"Mike\" is a 70 y.o. male presenting for initial evaluation of left middle ear effusion.  The patient reports developing left aural fullness and fluid within his left ear following a URI and a flight 2 months ago.  He had a audiogram at an outside audiology clinic notable for right sensorineural hearing loss and left mixed hearing loss.  Received systemic steroids in addition to Flonase without significant improvement of his symptoms.  Currently endorses persistent left aural fullness, decreased hearing, crackling/popping.  Denies otalgia, otorrhea, vertigo.  No history of prior ear surgeries.    Medical History[1]    Surgical History[2]      Medications Ordered Prior to Encounter[3]     RX Allergies[4]     Review of Systems  A detailed 12 point ROS was performed and is negative except as noted in the intake form, HPI and/or Past Medical History        Physical Exam   CONSTITUTIONAL: Well developed, well nourished.  VOICE: Normal voice quality  RESPIRATION: Breathing comfortably, no stridor.  CV: No clubbing/cyanosis/edema in hands.  EYES: EOM Intact, sclera normal.  NEURO: Alert and oriented times 3, Cranial nerves V,VII intact and symmetric bilaterally.  HEAD AND FACE: Symmetric facial features, no masses or lesions, sinuses nontender to palpation.  SALIVARY GLANDS: Parotid and submandibular glands normal bilaterally.  + EARS: Normal external ears  Right EAC patent, tympanic membrane intact  Left EAC patent, tympanic membrane with PE tube in place and patent  NOSE: External nose midline, anterior rhinoscopy is normal with limited visualization to the anterior aspect of the interior turbinates. No lesions noted.  ORAL CAVITY/OROPHARYNX/LIPS: Normal mucous membranes, " normal floor of mouth/tongue/OP, no masses or lesions are noted.  PHARYNGEAL WALLS AND NASOPHARYNX: No masses noted. Mucosa appears clean and moist  NECK/LYMPH: No LAD, no thyroid masses. Trachea palpably midline  SKIN: Neck skin is without injury  PSYCH: Alert and oriented with appropriate mood and affect     Results:   Audiogram 7/22/2025 personally reviewed  Right: Normal /mild sensorineural hearing loss up to 2000 Hz sloping to moderate sensorineural hearing loss.  Speech discrimination score 100%.  Type A tympanogram.  Left: Moderate sensorineural hearing loss at 125 Hz, mild/normal hearing up to 2000 Hz sloping to moderate sensorineural hearing loss.  Speech discrimination score 100%.  Tympanogram consistent with patent PE tube.      Assessment  Bilateral sensorineural hearing loss  Left eustachian tube dysfunction    Plan  The patient feels his hearing returned to baseline following left ear tube placement.   Audiogram notable for bilateral sensorineural hearing loss with slight right asymmetry at 3000 and 4000 Hz, we will proceed with ABR for further evaluation.   RTC 6w         [1]   Past Medical History:  Diagnosis Date    Allergic Fall 2018    Allergic rhinitis years ago    Ankle sprain     Chronic rhinitis 12/04/2013    Rhinitis    Eustachian tube dysfunction March 2025    HL (hearing loss)     Hypertension     Low back strain     Personal history of other diseases of the digestive system     History of gastritis    Personal history of other diseases of the digestive system     History of hemorrhoids    Personal history of other diseases of the respiratory system     History of bronchitis    Rotator cuff syndrome     Sleep apnea 2013    Tendinitis of knee     Tinnitus 2010    Unspecified fracture of lower end of unspecified humerus, initial encounter for closed fracture     Elbow fracture   [2]   Past Surgical History:  Procedure Laterality Date    CIRCUMCISION, PRIMARY  1956    COSMETIC SURGERY       OTHER SURGICAL HISTORY  03/02/2015    Acellular Dermal Allograft Eyelids    ROTATOR CUFF REPAIR Right     TYMPANOSTOMY TUBE PLACEMENT  5/20/2025    VASECTOMY  1999    Surgery Vas Deferens Vasectomy   [3]   Current Outpatient Medications on File Prior to Visit   Medication Sig Dispense Refill    aspirin 81 mg EC tablet Take by mouth.      atorvastatin (Lipitor) 40 mg tablet TAKE 1 TABLET BY MOUTH ONCE  DAILY AT BEDTIME 90 tablet 3    cholecalciferol (Vitamin D-3) 50 mcg (2,000 unit) capsule Take 2 capsules (100 mcg) by mouth early in the morning..      coenzyme Q-10 200 mg capsule Take 1 capsule (200 mg) by mouth once daily.      fluticasone propionate (FLONASE ALLERGY RELIEF NASL)       ketoconazole (Nizoral) 2 % shampoo Use twice weekly      lisinopril 20 mg tablet TAKE 1 TABLET BY MOUTH ONCE  DAILY 90 tablet 3    loratadine (CLARITIN ORAL)       minoxidiL (Rogaine) 5 % foam USE AS DIRECTED ON PACKAGE      multivitamin tablet Take 1 tablet by mouth once daily.      tadalafil (Cialis) 5 mg tablet Take 1 tablet (5 mg) by mouth once daily as needed for erectile dysfunction. 12 tablet 3     No current facility-administered medications on file prior to visit.   [4] No Known Allergies

## 2025-09-12 ENCOUNTER — APPOINTMENT (OUTPATIENT)
Dept: ORTHOPEDIC SURGERY | Facility: CLINIC | Age: 70
End: 2025-09-12
Payer: MEDICARE

## 2025-09-23 ENCOUNTER — APPOINTMENT (OUTPATIENT)
Dept: PRIMARY CARE | Facility: CLINIC | Age: 70
End: 2025-09-23
Payer: MEDICARE

## 2025-09-26 ENCOUNTER — APPOINTMENT (OUTPATIENT)
Dept: AUDIOLOGY | Facility: CLINIC | Age: 70
End: 2025-09-26
Payer: MEDICARE

## 2025-10-01 ENCOUNTER — APPOINTMENT (OUTPATIENT)
Dept: PRIMARY CARE | Facility: CLINIC | Age: 70
End: 2025-10-01
Payer: MEDICARE

## 2025-10-06 ENCOUNTER — APPOINTMENT (OUTPATIENT)
Dept: OTOLARYNGOLOGY | Facility: CLINIC | Age: 70
End: 2025-10-06
Payer: MEDICARE

## 2026-06-08 ENCOUNTER — APPOINTMENT (OUTPATIENT)
Dept: SLEEP MEDICINE | Facility: CLINIC | Age: 71
End: 2026-06-08
Payer: MEDICARE